# Patient Record
Sex: FEMALE | Race: WHITE | Employment: OTHER | URBAN - METROPOLITAN AREA
[De-identification: names, ages, dates, MRNs, and addresses within clinical notes are randomized per-mention and may not be internally consistent; named-entity substitution may affect disease eponyms.]

---

## 2024-09-30 ENCOUNTER — TELEPHONE (OUTPATIENT)
Dept: SURGERY | Facility: CLINIC | Age: 74
End: 2024-09-30

## 2024-09-30 ENCOUNTER — HOSPITAL ENCOUNTER (OUTPATIENT)
Dept: GENERAL RADIOLOGY | Facility: HOSPITAL | Age: 74
Discharge: HOME | End: 2024-09-30
Attending: NEUROLOGICAL SURGERY
Payer: MEDICARE

## 2024-09-30 ENCOUNTER — OFFICE VISIT (OUTPATIENT)
Dept: SURGERY | Facility: CLINIC | Age: 74
End: 2024-09-30
Payer: MEDICARE

## 2024-09-30 VITALS
HEIGHT: 63 IN | HEART RATE: 83 BPM | BODY MASS INDEX: 31.89 KG/M2 | SYSTOLIC BLOOD PRESSURE: 122 MMHG | DIASTOLIC BLOOD PRESSURE: 64 MMHG | WEIGHT: 180 LBS

## 2024-09-30 DIAGNOSIS — G95.9 CERVICAL MYELOPATHY (HCC): Primary | ICD-10-CM

## 2024-09-30 DIAGNOSIS — G95.9 CERVICAL MYELOPATHY (HCC): ICD-10-CM

## 2024-09-30 PROCEDURE — 99204 OFFICE O/P NEW MOD 45 MIN: CPT | Performed by: NEUROLOGICAL SURGERY

## 2024-09-30 PROCEDURE — 72052 X-RAY EXAM NECK SPINE 6/>VWS: CPT | Performed by: NEUROLOGICAL SURGERY

## 2024-09-30 RX ORDER — AMLODIPINE BESYLATE 5 MG/1
TABLET ORAL
COMMUNITY
Start: 2024-04-30

## 2024-09-30 RX ORDER — BIOTIN 1 MG
1000 TABLET ORAL 3 TIMES DAILY
COMMUNITY

## 2024-09-30 RX ORDER — FOLIC ACID 1 MG/1
TABLET ORAL DAILY
COMMUNITY

## 2024-09-30 RX ORDER — LABETALOL 100 MG/1
100 TABLET, FILM COATED ORAL 2 TIMES DAILY
COMMUNITY

## 2024-09-30 RX ORDER — LEVOTHYROXINE SODIUM 100 UG/1
TABLET ORAL
COMMUNITY
Start: 2024-04-28

## 2024-09-30 RX ORDER — DULOXETIN HYDROCHLORIDE 60 MG/1
60 CAPSULE, DELAYED RELEASE ORAL DAILY
COMMUNITY

## 2024-09-30 RX ORDER — FENOFIBRATE 145 MG/1
TABLET, COATED ORAL
COMMUNITY
Start: 2024-04-15

## 2024-09-30 RX ORDER — CARVEDILOL 25 MG/1
TABLET ORAL
COMMUNITY
Start: 2024-06-11

## 2024-09-30 RX ORDER — HYDROCHLOROTHIAZIDE 25 MG/1
TABLET ORAL
COMMUNITY
Start: 2024-09-11

## 2024-09-30 RX ORDER — PANTOPRAZOLE SODIUM 20 MG/1
20 TABLET, DELAYED RELEASE ORAL
COMMUNITY

## 2024-09-30 RX ORDER — DONEPEZIL HYDROCHLORIDE 10 MG/1
TABLET, FILM COATED ORAL
COMMUNITY
Start: 2024-05-29

## 2024-09-30 RX ORDER — PREDNISONE 5 MG/1
TABLET ORAL
COMMUNITY
Start: 2024-06-10

## 2024-09-30 RX ORDER — DAPAGLIFLOZIN 5 MG/1
5 TABLET, FILM COATED ORAL DAILY
COMMUNITY

## 2024-09-30 RX ORDER — BUPROPION HYDROCHLORIDE 150 MG/1
TABLET ORAL
COMMUNITY
Start: 2024-05-29

## 2024-09-30 RX ORDER — ALENDRONATE SODIUM 70 MG/1
TABLET ORAL
COMMUNITY
Start: 2024-05-28

## 2024-09-30 RX ORDER — POTASSIUM CHLORIDE 750 MG/1
TABLET, EXTENDED RELEASE ORAL
COMMUNITY
Start: 2024-04-29

## 2024-09-30 RX ORDER — ENALAPRIL MALEATE 20 MG/1
TABLET ORAL
COMMUNITY
Start: 2024-06-03

## 2024-09-30 RX ORDER — BUPROPION HYDROCHLORIDE 300 MG/1
TABLET ORAL
COMMUNITY
Start: 2024-08-15

## 2024-09-30 RX ORDER — CLONIDINE HYDROCHLORIDE 0.1 MG/1
TABLET ORAL
COMMUNITY
Start: 2024-05-28

## 2024-09-30 RX ORDER — HYDROXYCHLOROQUINE SULFATE 200 MG/1
TABLET, FILM COATED ORAL
COMMUNITY
Start: 2024-09-20

## 2024-09-30 RX ORDER — FERROUS SULFATE 325(65) MG
325 TABLET, DELAYED RELEASE (ENTERIC COATED) ORAL
COMMUNITY

## 2024-09-30 RX ORDER — MEMANTINE HYDROCHLORIDE 5 MG/1
5 TABLET ORAL 2 TIMES DAILY
COMMUNITY

## 2024-09-30 NOTE — PROGRESS NOTES
[facility]  Neurological Surgery Clinic Note    Dacia Bran  3/20/1950  [id number]  PCP: ASHLEY URIOSTEGUI    REASON FOR VISIT:  RUE radiculopathy    HISTORY OF PRESENT ILLNESS:  Dacia Bran is a(n) 74 year old female who states she has neck pain with RUE radiculopathy which seems to be progressing.  She states she drops things with her R hand and feels her gait is off as well. Family at bedside agree.  She denies any falls. She denies any bowel/bladder habit changes.    MRI shows severe DDD with spondy at C3-4, C4-5, C5-6 and C6-7.  Mild reversal of normal lordotic curve      Location: neck apin  Quality: RUE radiculopathy and weakness  Severity: progressive  Duration: months  Timing: any  Context: severe DDD from C3-7  Modifying Factors: none   Associated signs/symptoms: pain      PAST MEDICAL HISTORY:  Past Medical History:    Chronic kidney disease, stage 3b (HCC)    Diabetes (HCC)    Essential hypertension       PAST SURGICAL HISTORY:  Past Surgical History:   Procedure Laterality Date    Lumbar spine surgery  2022       FAMILY HISTORY:  family history is not on file.    SOCIAL HISTORY:   reports that she has quit smoking. Her smoking use included cigarettes. She has never used smokeless tobacco. She reports that she does not drink alcohol and does not use drugs.    ALLERGIES:  Allergies   Allergen Reactions    Methotrexate OTHER (SEE COMMENTS)     Swelling       MEDICATIONS:  Current Outpatient Medications on File Prior to Visit   Medication Sig Dispense Refill    levothyroxine 100 MCG Oral Tab       amLODIPine 5 MG Oral Tab       buPROPion  MG Oral Tablet 24 Hr       buPROPion  MG Oral Tablet 24 Hr       cloNIDine 0.1 MG Oral Tab       enalapril 20 MG Oral Tab       fenofibrate 145 MG Oral Tab       hydroCHLOROthiazide 25 MG Oral Tab       hydroxychloroquine 200 MG Oral Tab       Potassium Chloride ER 10 MEQ Oral Tab CR       DULoxetine 60 MG Oral Cap DR Particles Take 1 capsule (60  mg total) by mouth daily.      folic acid 1 MG Oral Tab Take by mouth daily.      ferrous sulfate 325 (65 FE) MG Oral Tab EC Take 1 tablet (325 mg total) by mouth daily with breakfast.      memantine 5 MG Oral Tab Take 1 tablet (5 mg total) by mouth 2 (two) times daily.      pantoprazole 20 MG Oral Tab EC Take 1 tablet (20 mg total) by mouth every morning before breakfast.      labetalol 100 MG Oral Tab Take 1 tablet (100 mg total) by mouth 2 (two) times daily.      dapagliflozin (FARXIGA) 5 MG Oral Tab Take 1 tablet (5 mg total) by mouth daily.      Cholecalciferol 125 MCG (5000 UT) Oral Tab Take 1 tablet (5,000 Units total) by mouth daily.      Biotin 1000 MCG Oral Tab Take 1,000 mcg by mouth 3 (three) times daily.      carvedilol 25 MG Oral Tab  (Patient not taking: Reported on 9/30/2024)      donepezil 10 MG Oral Tab  (Patient not taking: Reported on 9/30/2024)      alendronate 70 MG Oral Tab  (Patient not taking: Reported on 9/30/2024)      metFORMIN 500 MG Oral Tab  (Patient not taking: Reported on 9/30/2024)      predniSONE 5 MG Oral Tab  (Patient not taking: Reported on 9/30/2024)       No current facility-administered medications on file prior to visit.       REVIEW OF SYSTEMS:  Review of Systems   All other systems reviewed and are negative.       PHYSICAL EXAMINATION:  Neurologic Exam     Mental Status   Oriented to person, place, and time.     Cranial Nerves   Cranial nerves II through XII intact.     Motor Exam   Muscle bulk: normal  Overall muscle tone: normal  Right arm tone: normal  Left arm tone: normal  Right arm pronator drift: absent  Left arm pronator drift: absent  Right leg tone: normal  Left leg tone: normal    Strength   Strength 5/5 throughout.     Sensory Exam   Light touch normal.   Vibration normal.   Proprioception normal.   Pinprick normal.     Gait, Coordination, and Reflexes     Gait  Gait: normal    Reflexes   Right brachioradialis: 2+  Left brachioradialis: 2+  Right biceps:  2+  Left biceps: 2+  Right triceps: 2+  Left triceps: 2+  Right patellar: 2+  Left patellar: 2+  Right achilles: 2+  Left achilles: 2+  Right : 2+  Left : 2+       IMAGING:  As above    ASSESSMENT:  Cervical spondylotic myelopathy/radiculopathy    Plan:  Obtain XR C spine  Recommend C3-7 ACDF      The plan of care was discussed with the patient at length. All questions and concerns were addressed at this time. The patient verbalized agreement with the plan and was appreciative.     Total visit time: 45 minutes; More than 50% spent coordinating care, counseling, reviewing imaging and discussing medication therapy.     Wei Cheek, DO  Neurological Surgery  [facility]

## 2024-09-30 NOTE — PATIENT INSTRUCTIONS
You are scheduled for C3-C7 ACDF on 11.14.24 with  at [facility].    You will need to contact the Pre-admission department at [phone] to schedule your pre-op testing.  They will get you scheduled for all the blood work, MRSA/MSSA, chest xray and EKG if needed. If they do not answer when you call, please leave a message and they will call you back, they return calls in surgical order, it may be a few days before they return your call.    PCP clearance is needed.  We have faxed a clearance request to Dr. Cheek 's office.  Please contact their office for appointment.     CARDIAC clearance is needed.  We have faxed a request for pre-op clearance to your CARDIOLOGIST Dr Acosta. Please contact their office for appointment.    NEPHROLOGY clearance is needed.  We have faxed a request for pre-op clearance to your NEPHROLOGY Dr Huang. Please contact their office for appointment.    Do not take any blood thinning medications, over the counter non-steroidal anti inflammatories (ibuprofen, advil, aleve etc.), herbal supplements (garlic,tumeric etc.), vitamins, fish oil or krill oil for at least 7-14 days prior to surgery.     If you were on blood thinners (such as Coumadin, Plavix, Pradaxa, Xarelto, etc) prior to surgery that we had you stop for surgery, you will need to be cleared by your cardiologist to hold the medication prior to surgery.  Please make sure you get instructions about when to resume the medication before you are discharged from the hospital.    You may only take Tylenol, Extra Strength Tylenol, Arthritis Tylenol, or prescription Norco or Tramadol for pain if something is needed.    You should have nothing to eat or drink after 11:00pm the night prior to surgery except the following:    Do drink 12 ounces of regular Gatorade (NOT RED) 12 hours and 4 hours prior to your scheduled surgery time, Do not drink any other liquids (including water) before your surgery. Do not chew gum or eat candies  before surgery.  Take 1000 mg of Tylenol (Acetaminophen) 4 hours before your scheduled surgery time, take this with your scheduled Gatorade.    Surgery is usually scheduled as a 1-2 day admission.  This is an estimate and varies from person to person.  Ultimately, the surgeon will determine when you are ready to be discharged.    Our office will get authorization for surgery. We will attempt to contact you 3 days before surgery if we run into any complications or have not received your surgery authorization. We will continue to attempt to get authorization until 2pm the day before surgery. If authorization is not received we will give you a call to discuss next steps. Our goal is to make sure you do not proceed with an un-authorized surgery so that you do not end up having to pay for this surgery out of pocket.     The hospital will contact you 1-2 days before surgery with your expected arrival time.     You may need an Aspen cervical collar (A Rep will assists in setting these up if ordered and authorized). Your Rep will provide you with their contact information in case you have any questions about the device or it's use.     You may need an external bone growth stimulator. If ordered by your surgery a rep will contact you either before or after your surgery. Your Rep will provide you with their contact information in case you have any questions about the device or it's use.     To prevent infection post-operatively, you will need to shower with Hibiclens soap for 5 days prior to surgery. The last shower should be the night before surgery.  Hibiclens soap can be found at any pharmacy in the first-aid section.  See detailed instructions below.    Per Dr. Cheek's surgery protocol your appointments are as follows:     2 week pre-op surgical telehealth appointment is on 10.30.24 at 9:00 am with Katharine Crook RN     1 week pre-op surgical review scheduled on 11.6.24 at 11:20 am with Dr. Cheek.    1 week post-op  appointment scheduled on 24 at 11:00 am with REAL Weaver     4 week post-op appointment scheduled on 24 at 11:20 am with Dr. Cheek     3 month post surgery appointment scheduled on 25 at 11:30 am with REAL Weaver     Please make sure to arrive at least 15 minutes prior to your scheduled appointment in order to get checked in and roomed in a timely manner.  Depending on provider availability, late patients may be required to reschedule.  REFILLS:  After surgery, please remember that we do have a 48 hour refill policy that does not include weekends, please make sure to request your medications in a timely manner so that you do not go days without medication.  *Refills should be requested through your pharmacy or through the refill request in [employer] (log in, go to medications, then select refill request).  [email or web address] MESSAGING:  Please remember that our office is closed during the weekend and no one is available for [employer] messages. If you have an urgent or emergent matter please go to a walk-in center or the emergency room. Also please remember your [employer] messages are part of your legal medical record and should not be utilized as a personal email with our providers as it is visible to all [facility] employees. Also, Since [employer] messages are not for emergent matters it may be several days before there is a response to your message.  FMLA/PAPERWORK COMPLETED BY OUR MEDICAL FORMS DEPARTMENT:    If you require FMLA paperwork for your surgery, please make sure to either Drop it off or have it faxed to our office at [phone]. Make sure it has your NAME, , and has your signature. You will need to have a Release of Information on file. To facilitate this process we ask that you requested it at the  on your way out and sign it. Without a signed MIKAELA or signature on the form we will not be able to fax it and this will cause a delay with your forms. Fees charged for forms  are $25 for initial submission and $15 for recertifications. If you have questions on the status of your forms please call the forms department at [phone].  **We do have a 3 week policy for all forms and paperwork, please make sure to allow plenty of time for completion. Same day paperwork will not be completed. **    Spine Navigator  You will have a 30 minute telehealth visit with our spine navigator approximately 2 weeks before your surgery. This visit is NOT optional. This appointment will get booked when you schedule your spinal surgery.  When speaking with Pre-admissions you will be told about a spine class as it relates to your surgery, this is an OPTIONAL class. The same or similar information will be discussed with you during your telehealth appointment with the spine navigator (Spine Navigator appointment is not optional). However, if you would like to have this information repeated to you or if you would feel more comfortable with additional information, you can elect to schedule this class during your pre-admissions phone call.  The Pre-op Spine Surgery Class is held at [facility] most Wednesdays from 3:30-4:30 pm. Call Pre-admission Testing (PAT) to register at:  [phone]. Please park in the [facility] Parking garage, check in at registration and meet in the [facility] lobby for your escort to class on the Ortho Spine unit. If unable to attend, but would like additional information, the class is available online at [email or web address].     Due to COVID, visitor guidelines are constantly changing.  Please visit the following website for the detailed and up-to-date visitor screening and restriction policy at [facility] [email or web address].    Hibiclens Bathing  Hibiclens is a body soap that is used before surgery protect you from getting an infection after surgery   Hibiclens comes in a large blue bottle and can be found in most pharmacies in the First Aid  supplies  Shower with this daily for FIVE consecutive days before surgery, using the entire bottle over the five days.  The last shower should be the night before surgery.   Steps to bathing with Hibiclens  Do not use Hibiclens on your hair, face or private areas  Wash your hair and face as normal with your usual cleansers  Rinse well  Using a clean wet washcloth apply enough Hibiclens to cover your body. Wash from the neck down avoiding the genital areas and concentrating on the surgical area  Rinse well  Dry yourself with a clean, dry towel  Do not use any powders, creams, lotions or sprays on your body as these attract bacteria  Deodorant and facial creams are acceptable.   (Laundering/cleaning: Chlorhexidine gluconate skin cleansers will cause stains if used with chlorine releasing products. Rinse completely and use only non-chlorine detergents.)      For Office Use Only:  Medical Clearance Requested: PCP, CARDS, NEPHROLOGY  PA: medicare  CPT Codes:

## 2024-09-30 NOTE — TELEPHONE ENCOUNTER
You are scheduled for C3-C7 ACDF on 11.14.24 with  at [facility].    You will need to contact the Pre-admission department at [phone] to schedule your pre-op testing.  They will get you scheduled for all the blood work, MRSA/MSSA, chest xray and EKG if needed. If they do not answer when you call, please leave a message and they will call you back, they return calls in surgical order, it may be a few days before they return your call.    PCP clearance is needed.  We have faxed a clearance request to Dr. Cheek 's office.  Please contact their office for appointment.     CARDIAC clearance is needed.  We have faxed a request for pre-op clearance to your CARDIOLOGIST Dr Acosta. Please contact their office for appointment.    NEPHROLOGY clearance is needed.  We have faxed a request for pre-op clearance to your NEPHROLOGY Dr Huang. Please contact their office for appointment.    Do not take any blood thinning medications, over the counter non-steroidal anti inflammatories (ibuprofen, advil, aleve etc.), herbal supplements (garlic,tumeric etc.), vitamins, fish oil or krill oil for at least 7-14 days prior to surgery.     If you were on blood thinners (such as Coumadin, Plavix, Pradaxa, Xarelto, etc) prior to surgery that we had you stop for surgery, you will need to be cleared by your cardiologist to hold the medication prior to surgery.  Please make sure you get instructions about when to resume the medication before you are discharged from the hospital.    You may only take Tylenol, Extra Strength Tylenol, Arthritis Tylenol, or prescription Norco or Tramadol for pain if something is needed.    You should have nothing to eat or drink after 11:00pm the night prior to surgery except the following:    Do drink 12 ounces of regular Gatorade (NOT RED) 12 hours and 4 hours prior to your scheduled surgery time, Do not drink any other liquids (including water) before your surgery. Do not chew gum or eat candies  before surgery.  Take 1000 mg of Tylenol (Acetaminophen) 4 hours before your scheduled surgery time, take this with your scheduled Gatorade.    Surgery is usually scheduled as a 1-2 day admission.  This is an estimate and varies from person to person.  Ultimately, the surgeon will determine when you are ready to be discharged.    Our office will get authorization for surgery. We will attempt to contact you 3 days before surgery if we run into any complications or have not received your surgery authorization. We will continue to attempt to get authorization until 2pm the day before surgery. If authorization is not received we will give you a call to discuss next steps. Our goal is to make sure you do not proceed with an un-authorized surgery so that you do not end up having to pay for this surgery out of pocket.     The hospital will contact you 1-2 days before surgery with your expected arrival time.     You may need an Aspen cervical collar (A Rep will assists in setting these up if ordered and authorized). Your Rep will provide you with their contact information in case you have any questions about the device or it's use.     You may need an external bone growth stimulator. If ordered by your surgery a rep will contact you either before or after your surgery. Your Rep will provide you with their contact information in case you have any questions about the device or it's use.     To prevent infection post-operatively, you will need to shower with Hibiclens soap for 5 days prior to surgery. The last shower should be the night before surgery.  Hibiclens soap can be found at any pharmacy in the first-aid section.  See detailed instructions below.    Per Dr. Cheek's surgery protocol your appointments are as follows:     2 week pre-op surgical telehealth appointment is on 10.30.24 at 9:00 am with Katharine Crook RN     1 week pre-op surgical review scheduled on 11.6.24 at 11:20 am with Dr. Cheek.    1 week post-op  appointment scheduled on 24 at 11:00 am with REAL Weaver     4 week post-op appointment scheduled on 24 at 11:20 am with Dr. Cheek     3 month post surgery appointment scheduled on 25 at 11:30 am with REAL Weaver     Please make sure to arrive at least 15 minutes prior to your scheduled appointment in order to get checked in and roomed in a timely manner.  Depending on provider availability, late patients may be required to reschedule.  REFILLS:  After surgery, please remember that we do have a 48 hour refill policy that does not include weekends, please make sure to request your medications in a timely manner so that you do not go days without medication.  *Refills should be requested through your pharmacy or through the refill request in [employer] (log in, go to medications, then select refill request).  [employer] MESSAGING:  Please remember that our office is closed during the weekend and no one is available for [employer] messages. If you have an urgent or emergent matter please go to a walk-in center or the emergency room. Also please remember your [employer] messages are part of your legal medical record and should not be utilized as a personal email with our providers as it is visible to all [facility] employees. Also, Since [employer] messages are not for emergent matters it may be several days before there is a response to your message.  FMLA/PAPERWORK COMPLETED BY OUR MEDICAL FORMS DEPARTMENT:    If you require FMLA paperwork for your surgery, please make sure to either Drop it off or have it faxed to our office at [phone]. Make sure it has your NAME, , and has your signature. You will need to have a Release of Information on file. To facilitate this process we ask that you requested it at the  on your way out and sign it. Without a signed MIKAELA or signature on the form we will not be able to fax it and this will cause a delay with your forms. Fees charged for forms  are $25 for initial submission and $15 for recertifications. If you have questions on the status of your forms please call the forms department at [phone].  **We do have a 3 week policy for all forms and paperwork, please make sure to allow plenty of time for completion. Same day paperwork will not be completed. **    Spine Navigator  You will have a 30 minute telehealth visit with our spine navigator approximately 2 weeks before your surgery. This visit is NOT optional. This appointment will get booked when you schedule your spinal surgery.  When speaking with Pre-admissions you will be told about a spine class as it relates to your surgery, this is an OPTIONAL class. The same or similar information will be discussed with you during your telehealth appointment with the spine navigator (Spine Navigator appointment is not optional). However, if you would like to have this information repeated to you or if you would feel more comfortable with additional information, you can elect to schedule this class during your pre-admissions phone call.  The Pre-op Spine Surgery Class is held at [facility] most Wednesdays from 3:30-4:30 pm. Call Pre-admission Testing (PAT) to register at:  [phone]. Please park in the [facility] Parking garage, check in at registration and meet in the [facility] lobby for your escort to class on the Ortho Spine unit. If unable to attend, but would like additional information, the class is available online at [email or web address].     Due to COVID, visitor guidelines are constantly changing.  Please visit the following website for the detailed and up-to-date visitor screening and restriction policy at [facility] [email or web address].    Hibiclens Bathing  Hibiclens is a body soap that is used before surgery protect you from getting an infection after surgery   Hibiclens comes in a large blue bottle and can be found in most pharmacies in the First Aid  supplies  Shower with this daily for FIVE consecutive days before surgery, using the entire bottle over the five days.  The last shower should be the night before surgery.   Steps to bathing with Hibiclens  Do not use Hibiclens on your hair, face or private areas  Wash your hair and face as normal with your usual cleansers  Rinse well  Using a clean wet washcloth apply enough Hibiclens to cover your body. Wash from the neck down avoiding the genital areas and concentrating on the surgical area  Rinse well  Dry yourself with a clean, dry towel  Do not use any powders, creams, lotions or sprays on your body as these attract bacteria  Deodorant and facial creams are acceptable.   (Laundering/cleaning: Chlorhexidine gluconate skin cleansers will cause stains if used with chlorine releasing products. Rinse completely and use only non-chlorine detergents.)      For Office Use Only:  Medical Clearance Requested: PCP, CARDS, NEPHROLOGY  PA: medicare  CPT Codes:

## 2024-09-30 NOTE — PROGRESS NOTES
New patient:  Reason for visit:   Neck discomfort, R shoulder to R arm pain with numbness and tingling on R hand   Pt also reports balance issues   Symptoms initially started in March    Numeric Rating Scale:         Pain at Present:  2/10                                                                                                              Prior diagnostic testing related to this condition:    MRI cervical spine DOS 09/09/24 uploaded to pacs

## 2024-10-08 PROBLEM — N18.32 STAGE 3B CHRONIC KIDNEY DISEASE (HCC): Status: ACTIVE | Noted: 2024-10-08

## 2024-10-08 NOTE — TELEPHONE ENCOUNTER
Patient is scheduled for C3-C7 ACDF on 11.14.24 with  at [facility].     Y pre-op apt scheduled (if sx is more than 30 days from last apt)  Y pre-op apt scheduled with RN spine navigator  Y Surgical instructions reviewed by nursing staff with patient  Y  form completed  Y Surgery order signed   Y Placed sx on surgery sheet  Y Placed on outlook calendar  NA [employer] message sent to patient with sx instructions  Y Faxed pre-op clearance request to PCP GILA CABA Faxed letter to prescribing provider requesting anticoagulants be held for surgery  Y E-mail sent to [employer]  Y Post-op appointments made  NA PA NOT NEEDED. Routed to PA team to initiate.  Y Post-Op outreach pt reminder placed.   Y Entire Neurosurgery Checklist Completed    Clearances: PCP, CARDS, NEPHROLOGY   PA:MEDICARE A&B  CPT Codes: 41203, 22552 x3, 33326, 14235, 76949, 85597, 89648, 29329

## 2024-10-09 NOTE — TELEPHONE ENCOUNTER
Referred to Cristal Valdovinos LSO brace    Face sheet, order and OV note faxed to:  [phone]

## 2024-10-09 NOTE — TELEPHONE ENCOUNTER
Referred to [facility]  Dispense external bone growth stimulator    Face sheet, order and OV note faxed to:  [phone]

## 2024-10-10 ENCOUNTER — TELEPHONE (OUTPATIENT)
Dept: SURGERY | Facility: CLINIC | Age: 74
End: 2024-10-10

## 2024-10-10 NOTE — TELEPHONE ENCOUNTER
Patient is schedule for surgery and needs to plan her trip to [place] would like  to know  if the recovery will be 6  or 12 weeks ? She remembers Dr. Cheek saying if the surgery is through the front is 6 weeks if its through the back is 12 weeks recovery. She needs to know as soon as possible so she can plan accordingly.

## 2024-10-10 NOTE — TELEPHONE ENCOUNTER
Message below noted.    Surgery scheduled for 11.14.24 C3-7 ACDF.     LOV 9.30.24  \"ASSESSMENT:  Cervical spondylotic myelopathy/radiculopathy     Plan:  Obtain XR C spine  Recommend C3-7 ACDF\"    Routed to Provider.

## 2024-10-11 ENCOUNTER — TELEPHONE (OUTPATIENT)
Dept: SURGERY | Facility: CLINIC | Age: 74
End: 2024-10-11

## 2024-10-11 ENCOUNTER — MED REC SCAN ONLY (OUTPATIENT)
Dept: SURGERY | Facility: CLINIC | Age: 74
End: 2024-10-11

## 2024-10-11 RX ORDER — MONTELUKAST SODIUM 10 MG/1
10 TABLET ORAL NIGHTLY
COMMUNITY

## 2024-10-11 RX ORDER — DONEPEZIL HYDROCHLORIDE 10 MG/1
10 TABLET, FILM COATED ORAL NIGHTLY
COMMUNITY

## 2024-10-11 NOTE — TELEPHONE ENCOUNTER
Message below noted.    Called and advised pt of message.    Advised pt if she has any other questions or concerns to reach back out at anytime.    Pt acknowledged and appreciative of message.    Nothing needed further with this encounter.

## 2024-10-11 NOTE — TELEPHONE ENCOUNTER
Received DWO & [facility] of medical [facility] , for review and signature from provider  Endorsed in neuro surgery bin for clinical staff.

## 2024-10-15 NOTE — TELEPHONE ENCOUNTER
Received from [facility]  DOS 10/11/24 cardiac clearance letter.    Endorsed to neurosurgery [facility] for clinical staff.

## 2024-10-16 NOTE — TELEPHONE ENCOUNTER
CARDS presurgical clearance received per letter dated 10.15.24, \"This letter is to certify that Anjelica Bran,  1950, is cleared for the planned surgery with no indication for increased risk of cardiac complications\"    Faxed to Sam DOMINGUEZ; sent to scan; copy placed in ppwrk sent to scan folder

## 2024-10-25 NOTE — TELEPHONE ENCOUNTER
Received DWO & letter of medical necessity for cervical collar from [facility]    paperwork reviewed and signed by provider, paperwork was faxed to [phone] and sent to scan

## 2024-10-30 ENCOUNTER — NURSE ONLY (OUTPATIENT)
Age: 74
End: 2024-10-30
Payer: MEDICARE

## 2024-10-30 DIAGNOSIS — Z71.9 ENCOUNTER FOR EDUCATION: Primary | ICD-10-CM

## 2024-10-30 NOTE — PROGRESS NOTES
RN Spine Navigator Education for [place]     If you have received instructions from your surgeon that are different from those listed below, please follow your physician's instructions.    You are scheduled for a Cervical fusion with Dr. Cheek on 11/14.      Patient Surgical Goals: Decreased weakness and pain.     Before Your Surgery    Choose a Care Partner  Patient attended spine navigator visit independently.  Care partner is sons. Your care partner(s) should be able to provide transportation to and from the hospital. They should be able to help at home for the first week after discharge, including helping you with meals, medication, and dressing changes.    Clearance Before Surgery  You will need to see your primary care provider within 30 days before surgery. Please make sure this appointment is at least a week before surgery as more testing or doctor visits may be ordered. Presurgical testing may include labs, nasal swab, imaging, EKG.   Make sure that you complete all preadmission testing so that surgery does not get delayed.    Home Environment  Assessed home status: home has stairs and bedroom and bathroom are on first floor. Patient has a ramp into the house. Suggested arrangements for help at home.  It is recommended that you prepare your home by putting clean sheets on your bed, freezing meals, and putting frequently used items at waist level.   Prevent falls by removing items that could cause you to trip, adding nightlights and adding a nonskid mat in shower.   Assistive devices can be purchased at a medical supply store or online including canes, walkers, toileting devices (commodes, raised toilet seats, toilet paper wiping aid), long handled sponge, shower chair or tub transfer bench, grabber/reacher tool, sock aid, long handled shoehorn, if needed.      Tobacco, Nicotine and Marijuana Use   Not applicable    Medications to Stop   For 7-10 days before surgery do not take any blood thinning medications.  This includes non-steroidal anti-inflammatories or NSAIDs (Advil, Aleve, Motrin, ibuprofen, naproxen, meloxicam, diclofenac, celecoxib, etc.), aspirin (unless told otherwise by your cardiologist or surgeon), herbal supplements and vitamins (garlic, turmeric, vitamin E, fish oil or krill oil, etc.). You may only take Tylenol or prescribed narcotic medication if needed for pain.   Other medications to stop include: hold ACE/ARBS day of surgery - enalapril     Leading Up to Day of Surgery  Five days before surgery wash with Hibiclens soap after your regular body soap every day. Do not put use Hibiclens on your face, hair or privates. Your last shower should be the night before surgery. and use mupirocin (Bactroban) if prescribed.   One-two business days before surgery, the preadmission testing staff will call you and let you know what time to arrive, where to park, when to take your Tylenol and Gatorade, and will provide any additional instructions.   After 11pm the day before surgery, do not eat or drink anything (including water, gum, or candies) except for Tylenol and Gatorade.   Drink 12 ounces of regular Gatorade (NOT RED) 12 hours prior to your scheduled surgery time. Drink your second 12 ounces of regular Gatorade and take 1000 mg of Tylenol (acetaminophen) 4 hours before your scheduled surgery time.     Items to Bring to the Hospital   Bring insurance card, ID, advanced directive, or medical power of  paperwork, loose fitting clothing, shoes with a back that can accommodate swollen feet, long phone charging cord, hearing aids and brace.  Do not bring jewelry, valuables, or medications.   If you take an uncommon medication that the hospital may not have, it must be brought to the hospital in the original container, and you must notify the nurse of this medication.     In the Hospital     Operative Day and Hospital Stay  In the preoperative area, you will change into a gown, have an IV placed in your hand  or arm by the nurse, and sign any consent paperwork that is needed for your procedure. You will speak to the surgeon and anesthesiologist. It is important to tell the doctors and nurses if you have had any significant side effects from pain medications or anesthesia such as a rash or severe nausea.    In the operating room, the anesthesiologist will attach monitors, give you oxygen through a mask, and give you medicine through the IV to fall asleep. After you are sleeping, the breathing tube will be placed. The surgeon may use additional nerve monitoring during your surgery. This is to make sure that the muscles and nerves in your arms and legs are working normally as he operates. The equipment will be hooked up and removed while you are asleep. You will wake up on the hospital bed.     During the surgery, your care partner can sit in the surgical waiting area. There are TV screens in that area that keep them informed of your progress. If the procedure is at [facility], there is a person who can speak to them about your surgical progress.     In the recovery room, monitors will be attached that check your heart rate, blood pressure and oxygen levels. While you may not remember this part, a nurse is with you and constantly checking on your condition. Medications for pain and nausea will be given if you need them. You may have a banks catheter to empty your bladder or a drain at your surgical site. Your family is not allowed in the recovery room. When you are ready to leave the recovery room, you will be transported on your bed to the inpatient unit accompanied by your family once a room is available.  On the inpatient unit, a team of doctors and advanced practice providers will manage your care. The spine care nurses will check your blood pressure, temperature, heartbeat, breathing, stomach sounds and your incision. They may assess the strength and sensation in your arms and legs. Medications that are given in the  hospital include antibiotics, IV fluids, pain medications, muscle relaxers, stool softeners, and your home medications. You may get blood drawn and another x-ray. Physical and occupational therapists may come to your room to teach you how to move around safely after surgery.     Post Op Plan   The average length of hospital stay is one to three days. A  may visit you to help arrange extra care for you once you leave the hospital. Occasionally, it is recommended that a home health nurse or therapist visit you in your home for a short time. The best place to recover is your own home, but if you need more assistance than home health and your care partner can provide, the  will help you and your family choose other facilities to help you recover your strength.    Preventing surgical complications  It is important to follow all instructions before and after surgery to decrease the risks of surgery and prevent complications.     Blood clots: walk, wear leg compression devices in the hospital, and do ankle pumps at home  Infection: wash with Hibiclens before surgery, wash your hands, don't touch your incision  Pneumonia: take deep breaths and cough and use the breathing exercise (incentive spirometer)   Nausea/vomiting: start with liquids and small meals and do not take pills on an empty stomach  Constipation: drink water and walk frequently    Tell your nurse if you are experiencing nausea, vomiting or constipation as they have medications to help treat these.      At home     Understanding Pain After Surgery  We will do our best to manage your pain after surgery, but it is not possible to be completely pain-free. There will be operative pain in your back or neck. Pain in the arms or legs may be one of the first things to improve. Numbness, weakness, and tingling should improve over time. However, there can be a temporary increase in symptoms in the first few days due to inflammation from surgery.    Pain medications will be prescribed to take home at discharge. The goal of pain medicine after surgery is to make your pain tolerable, not to make you pain free. We encourage you to use the medication prescribed to you after your surgery, but please take the lowest possible dose to manage your pain. Taking high doses of narcotics can cause side effects. Transition to plain Tylenol when your pain improves. At [facility], your prescriptions can be filled by our pharmacy and brought to you bedside. You may get more continuous pain relief by alternating between medications if you have multiple instead of taking them at the same time. Write down when you have taken a medication as it may be difficult to remember after a few doses.    Post operative medication   Tylenol (acetaminophen): take for pain. Do not take more than 3000 mg - 4000 mg in 24 hours because it can damage your liver.   Narcotics: take for moderate to severe pain. Do not drink alcohol or drive while taking narcotics. Some narcotic medications (Norco, Tylenol #3, Percocet, Vicodin) contain Tylenol (acetaminophen). Make sure to not exceed the maximum dose if you are taking additional Tylenol with these medications.  Muscle relaxers: take for muscle cramping. These can cause drowsiness.    NSAIDS (Advil, Aleve, Motrin, ibuprofen, naproxen, meloxicam, diclofenac, celecoxib, etc.) or aspirin: Do not take these unless your physician says it is ok. For a fusion, it may be several months before you can take NSAIDS.  Stool softeners: take to prevent constipation while you are taking narcotic medications. You can get these over the counter at the pharmacy. You may use laxatives, which are stronger, if needed.    If you believe you will need more of medication your surgeon has prescribed to you, request a refill through your pharmacy or through the refill request in [employer] (log in, go to medications, then select refill request) at least two business days  before you run out of medication.     Nonpharmacologic pain management   You may use ice on your incision for 20 minutes every hour to help with pain and swelling. Do not place ice directly on your skin. You can use heat to sooth your muscles but avoid placing heat directly on your incision. Make frequent position changes. You can do gentle stretching while avoiding significant bending or twisting. Use deep breathing techniques and distractions such as TV, music, reading, or games.   Additional Recommendations for Anterior Cervical Surgery  Smoothies or protein shakes may be more comfortable to consume then solid food for the first week after surgery. To help decrease throat swelling, suck on ice chips and drink cold liquids. Cough drops can also be help decrease throat irritation.    Movement restrictions  After surgery, no bending or twisting your neck or back. Do not lift anything over 10lbs (about the weight of a gallon of milk) or lift anything over your shoulders. Avoid pulling or pushing. You may use stairs while holding the handrail.  It is ok to ride in the car but refrain from driving or traveling long distances until cleared to do so by your surgeon. You may not drive while taking narcotics or muscle relaxants. If you have cervical surgery, it may be several weeks before you can drive. , If you had a fracture or fusion surgery, your doctor may give you a brace. Braces are worn for comfort, when up and moving around, or constantly depending on your doctor's order. Wear your brace as instructed.     Post Op Exercise   Walk frequently. Start with walking short distances and increase as you start to feel better. Do ankle pumps (bending at your ankles, bring your feet towards your head then point them towards the ground) 15-20 times every hour when awake to help prevent blood clots.     Your surgeon will let you know at your post operative appointment if you are ready to decrease your movement restrictions and  increase your exercise. If you have questions in between appointments about lessening your restrictions, please contact the office.     You and your doctor will discuss how you are feeling as you heal and decide if outpatient physical therapy or a medical fitness referral is needed.    Caring for your incision  Always wash your hands before touching your incision. Your incision will be closed with sutures under the skin and skin glue or Steri-Strips (thin white bandages) on top of the skin. Do not attempt to peal off Skin glue/Steri-Strips as they will come off on their own. If the incision is closed with sutures or staples on top of the skin, they will be removed at a post op appointment. The incision may be covered with a gauze dressing that can come off after three days. Once the original gauze dressing is removed, we prefer that you leave your incision open to air (without a gauze dressing). If the incision has drainage or is rubbing against your clothes or brace, you may place gauze and medical tape over it. Change the gauze and medical tape daily. Look at your incision daily to check for signs of infection.   You can shower three days after your surgery or sooner if your surgeon allows. We recommend the care partner be present during the first shower for safety. Let soapy water run over the incision, but do not scrub it or spray it directly. Gently pat it dry after with a clean towel. Do not apply any creams or lotions to the incision. Do not soak in a tub, pool, or any body of water until your incision is fully healed.    Signs of Infection   Check your incision daily for swelling, redness, drainage, pus, bad smell, or opening of the incision.     When to Call for Assistance  Call the Neurosurgery Office ([phone] Option #2) if you experience signs of infection, opening of the incision, continuous nausea or vomiting, poor pain control despite using the pain medication as directed, a sudden increase in  pain, temperature over 101F, difficulty swallowing, leg swelling, or with any concerns, unanswered questions, or new problems, such as new numbness/weakness/tingling.  Call 911 or go to the nearest emergency room if you experience chest pain, difficulty breathing, loss of bowel or bladder control, extreme drowsiness, or any other life-threatening situation.     Follow-up Plan   Appointments with Dr. Cheek or [facility] at 1, 4 and 12 weeks    Answered questions regarding: None     You can contact the RN Spine Navigator at [phone] or [email or web address] with additional questions or feedback on your care. It may take several business days to receive a reply so please do not call the RN spine navigator for refills or for emergencies.    Spine navigator spent 47 minutes conducting a virtual visit to provide education. Thank you for letting the RN Spine Navigator participate in your care.

## 2024-10-30 NOTE — PROGRESS NOTES
RN Spine Navigator Education for Sarahi Bran     If you have received instructions from your surgeon that are different from those listed below, please follow your physician's instructions.    You are scheduled for a Cervical fusion with Dr. Cheek on 24.      Patient Surgical Goals: ***    Before Your Surgery    Choose a Care Partner  Patient attended spine navigator visit {Ind/CarePart:74883}.  Care partner is ***. Your care partner(s) should be able to provide transportation to and from the hospital. They should be able to help at home for the first week after discharge, including helping you with meals, medication, and dressing changes.    Clearance Before Surgery-***  You will need to see your primary care provider within 30 days before surgery. Please make sure this appointment is at least a week before surgery as more testing or doctor visits may be ordered. Presurgical testing may include labs, nasal swab, imaging, EKG.   Make sure that you complete all preadmission testing so that surgery does not get delayed.    Home Environment  Assessed home status: {HomeStatus:90530}. Suggested arrangements for {ArrHelp:94566}.  It is recommended that you prepare your home by putting clean sheets on your bed, freezing meals, and putting frequently used items at waist level.   Prevent falls by removing items that could cause you to trip, adding nightlights and adding a nonskid mat in shower.   Assistive devices can be purchased at a medical supply store or online including canes, walkers, toileting devices (commodes, raised toilet seats, toilet paper wiping aid), long handled sponge, shower chair or tub transfer bench, grabber/reacher tool, sock aid, long handled shoehorn, if needed.      Tobacco, Nicotine and Marijuana Use   {Smokin::\"Not applicable\"}    Medications to Stop   For 7-10 days before surgery do not take any blood thinning medications. This includes non-steroidal anti-inflammatories or NSAIDs  (Advil, Aleve, Motrin, ibuprofen, naproxen, meloxicam, diclofenac, celecoxib, etc.), aspirin (unless told otherwise by your cardiologist or surgeon), herbal supplements and vitamins (garlic, turmeric, vitamin E, fish oil or krill oil, etc.). You may only take Tylenol or prescribed narcotic medication if needed for pain.   Other medications to stop include: hold ACE/ARBS day of surgery  Enalapril-      Leading Up to Day of Surgery  Five days before surgery wash with Hibiclens soap after your regular body soap every day. Do not put use Hibiclens on your face, hair or privates. Your last shower should be the night before surgery.  One-two business days before surgery, the preadmission testing staff will call you and let you know what time to arrive, where to park, when to take your Tylenol and Gatorade, and will provide any additional instructions.   After 11pm the day before surgery, do not eat or drink anything (including water, gum, or candies) except for Tylenol and Gatorade.   Drink 12 ounces of regular Gatorade (NOT RED) 12 hours prior to your scheduled surgery time. Drink your second 12 ounces of regular Gatorade and take 1000 mg of Tylenol (acetaminophen) 4 hours before your scheduled surgery time.     Items to Bring to the Hospital   Bring insurance card, ID, advanced directive, or medical power of  paperwork, loose fitting clothing, shoes with a back that can accommodate swollen feet, long phone charging cord, {Bringwith:55976}.  Do not bring jewelry, valuables, or medications.   If you take an uncommon medication that the hospital may not have, it must be brought to the hospital in the original container, and you must notify the nurse of this medication.     In the Hospital     Operative Day and Hospital Stay  In the preoperative area, you will change into a gown, have an IV placed in your hand or arm by the nurse, and sign any consent paperwork that is needed for your procedure. You will speak to  the surgeon and anesthesiologist. It is important to tell the doctors and nurses if you have had any significant side effects from pain medications or anesthesia such as a rash or severe nausea.    In the operating room, the anesthesiologist will attach monitors, give you oxygen through a mask, and give you medicine through the IV to fall asleep. After you are sleeping, the breathing tube will be placed. The surgeon may use additional nerve monitoring during your surgery. This is to make sure that the muscles and nerves in your arms and legs are working normally as he operates. The equipment will be hooked up and removed while you are asleep. You will wake up on the hospital bed.     During the surgery, your care partner can sit in the surgical waiting area. There are TV screens in that area that keep them informed of your progress. If the procedure is at [facility], there is a person who can speak to them about your surgical progress.     In the recovery room, monitors will be attached that check your heart rate, blood pressure and oxygen levels. While you may not remember this part, a nurse is with you and constantly checking on your condition. Medications for pain and nausea will be given if you need them. You may have a banks catheter to empty your bladder or a drain at your surgical site. Your family is not allowed in the recovery room. When you are ready to leave the recovery room, you will be transported on your bed to the inpatient unit accompanied by your family once a room is available.  On the inpatient unit, a team of doctors and advanced practice providers will manage your care. The spine care nurses will check your blood pressure, temperature, heartbeat, breathing, stomach sounds and your incision. They may assess the strength and sensation in your arms and legs. Medications that are given in the hospital include antibiotics, IV fluids, pain medications, muscle relaxers, stool softeners, and your home  medications. You may get blood drawn and another x-ray. Physical and occupational therapists may come to your room to teach you how to move around safely after surgery.     Post Op Plan   The average length of hospital stay is one to three days. A  may visit you to help arrange extra care for you once you leave the hospital. Occasionally, it is recommended that a home health nurse or therapist visit you in your home for a short time. The best place to recover is your own home, but if you need more assistance than home health and your care partner can provide, the  will help you and your family choose other facilities to help you recover your strength.    Preventing surgical complications  It is important to follow all instructions before and after surgery to decrease the risks of surgery and prevent complications.     Blood clots: walk, wear leg compression devices in the hospital, and do ankle pumps at home  Infection: wash with Hibiclens before surgery, wash your hands, don't touch your incision  Pneumonia: take deep breaths and cough and use the breathing exercise (incentive spirometer)   Nausea/vomiting: start with liquids and small meals and do not take pills on an empty stomach  Constipation: drink water and walk frequently    Tell your nurse if you are experiencing nausea, vomiting or constipation as they have medications to help treat these.      At home     Understanding Pain After Surgery  We will do our best to manage your pain after surgery, but it is not possible to be completely pain-free. There will be operative pain in your back or neck. Pain in the arms or legs may be one of the first things to improve. Numbness, weakness, and tingling should improve over time. However, there can be a temporary increase in symptoms in the first few days due to inflammation from surgery.   Pain medications will be prescribed to take home at discharge. The goal of pain medicine after surgery is  to make your pain tolerable, not to make you pain free. We encourage you to use the medication prescribed to you after your surgery, but please take the lowest possible dose to manage your pain. Taking high doses of narcotics can cause side effects. Transition to plain Tylenol when your pain improves. At [facility], your prescriptions can be filled by our pharmacy and brought to you bedside. You may get more continuous pain relief by alternating between medications if you have multiple instead of taking them at the same time. Write down when you have taken a medication as it may be difficult to remember after a few doses.    Post operative medication   Tylenol (acetaminophen): take for pain. Do not take more than 3000 mg - 4000 mg in 24 hours because it can damage your liver.   Narcotics: take for moderate to severe pain. Do not drink alcohol or drive while taking narcotics. Some narcotic medications (Norco, Tylenol #3, Percocet, Vicodin) contain Tylenol (acetaminophen). Make sure to not exceed the maximum dose if you are taking additional Tylenol with these medications.  Muscle relaxers: take for muscle cramping. These can cause drowsiness.    NSAIDS (Advil, Aleve, Motrin, ibuprofen, naproxen, meloxicam, diclofenac, celecoxib, etc.) or aspirin: Do not take these unless your physician says it is ok. For a fusion, it may be several months before you can take NSAIDS.  Stool softeners: take to prevent constipation while you are taking narcotic medications. You can get these over the counter at the pharmacy. You may use laxatives, which are stronger, if needed.    If you believe you will need more of medication your surgeon has prescribed to you, request a refill through your pharmacy or through the refill request in [employer] (log in, go to medications, then select refill request) at least two business days before you run out of medication.     Nonpharmacologic pain management   You may use ice on your incision for  20 minutes every hour to help with pain and swelling. Do not place ice directly on your skin. You can use heat to sooth your muscles but avoid placing heat directly on your incision. Make frequent position changes. You can do gentle stretching while avoiding significant bending or twisting. Use deep breathing techniques and distractions such as TV, music, reading, or games.     Movement restrictions  After surgery, no bending or twisting your neck or back. Do not lift anything over 10lbs (about the weight of a gallon of milk) or lift anything over your shoulders. Avoid pulling or pushing. You may use stairs while holding the handrail.  It is ok to ride in the car but refrain from driving or traveling long distances until cleared to do so by your surgeon. You may not drive while taking narcotics or muscle relaxants. {Restrictions:28406}     Post Op Exercise   Walk frequently. Start with walking short distances and increase as you start to feel better. Do ankle pumps (bending at your ankles, bring your feet towards your head then point them towards the ground) 15-20 times every hour when awake to help prevent blood clots.     Your surgeon will let you know at your post operative appointment if you are ready to decrease your movement restrictions and increase your exercise. If you have questions in between appointments about lessening your restrictions, please contact the office.     You and your doctor will discuss how you are feeling as you heal and decide if outpatient physical therapy or a medical fitness referral is needed.    Caring for your incision  Always wash your hands before touching your incision. Your incision will be closed with sutures under the skin and skin glue or Steri-Strips (thin white bandages) on top of the skin. Do not attempt to peal off Skin glue/Steri-Strips as they will come off on their own. If the incision is closed with sutures or staples on top of the skin, they will be removed at a  post op appointment. The incision may be covered with a gauze dressing that can come off after three days. Once the original gauze dressing is removed, we prefer that you leave your incision open to air (without a gauze dressing). If the incision has drainage or is rubbing against your clothes or brace, you may place gauze and medical tape over it. Change the gauze and medical tape daily. Look at your incision daily to check for signs of infection.   You can shower three days after your surgery or sooner if your surgeon allows. We recommend the care partner be present during the first shower for safety. Let soapy water run over the incision, but do not scrub it or spray it directly. Gently pat it dry after with a clean towel. Do not apply any creams or lotions to the incision. Do not soak in a tub, pool, or any body of water until your incision is fully healed.    Signs of Infection   Check your incision daily for swelling, redness, drainage, pus, bad smell, or opening of the incision.     When to Call for Assistance  Call the Neurosurgery Office ([phone] Option #2) if you experience signs of infection, opening of the incision, continuous nausea or vomiting, poor pain control despite using the pain medication as directed, a sudden increase in pain, temperature over 101F, difficulty swallowing, leg swelling, or with any concerns, unanswered questions, or new problems, such as new numbness/weakness/tingling.  Call 911 or go to the nearest emergency room if you experience chest pain, difficulty breathing, loss of bowel or bladder control, extreme drowsiness, or any other life-threatening situation.     Follow-up Plan   Appointments with Dr. Cheek or Odilia at 1, 4 and 12 weeks  1 week pre-op surgical review scheduled on 11.6.24 at 11:20 am with Dr. Cheek.     1 week post-op appointment scheduled on 11.22.24 at 11:00 am with REAL Weaver     4 week post-op appointment scheduled on 12.11.24 at 11:20 am with  Dr. Cheek     3 month post surgery appointment scheduled on 1.6.25 at 11:30 am with REAL Weaver    Answered questions regarding ***     You can contact the RN Spine Navigator at [phone] or [email or web address] with additional questions or feedback on your care. It may take several business days to receive a reply so please do not call the RN spine navigator for refills or for emergencies.    Spine navigator spent *** minutes conducting a virtual visit to provide education. Thank you for letting the RN Spine Navigator participate in your care.

## 2024-10-31 NOTE — TELEPHONE ENCOUNTER
Clearances needed  PCP: Dr. Ham KITCHEN- Not yet received  CARDS: Dr. Karla DOTY - Received-in media  NEPHROLOGY: Dr Jones RAPHAEL-appt 10/22/24- note not yet received

## 2024-11-04 NOTE — TELEPHONE ENCOUNTER
PCP and Nephrology presurgical clearances not yet received.  Re-faxed letters to Dr Cheek and Dr Huang

## 2024-11-05 NOTE — TELEPHONE ENCOUNTER
PCP presurgical clearance received per note dated 11.4.24, \"Cleared for surgery\"    Faxed note, labs, CXR,EKG and Echo report to Sam DOMINGUEZ; sent to scan; copy placed in ppwrk sent to scan folder

## 2024-11-06 ENCOUNTER — LABORATORY ENCOUNTER (OUTPATIENT)
Dept: LAB | Facility: HOSPITAL | Age: 74
End: 2024-11-06
Attending: NEUROLOGICAL SURGERY
Payer: MEDICARE

## 2024-11-06 ENCOUNTER — OFFICE VISIT (OUTPATIENT)
Dept: SURGERY | Facility: CLINIC | Age: 74
End: 2024-11-06
Payer: MEDICARE

## 2024-11-06 VITALS — DIASTOLIC BLOOD PRESSURE: 72 MMHG | HEART RATE: 80 BPM | SYSTOLIC BLOOD PRESSURE: 160 MMHG

## 2024-11-06 DIAGNOSIS — G95.9 CERVICAL MYELOPATHY (HCC): Primary | ICD-10-CM

## 2024-11-06 DIAGNOSIS — G95.9 CERVICAL MYELOPATHY (HCC): ICD-10-CM

## 2024-11-06 PROBLEM — E11.9 DIABETES MELLITUS WITHOUT COMPLICATION (HCC): Status: ACTIVE | Noted: 2024-11-06

## 2024-11-06 PROBLEM — I13.10 HYPERTENSIVE HEART AND KIDNEY DISEASE: Status: ACTIVE | Noted: 2024-11-06

## 2024-11-06 PROBLEM — M47.816 LUMBAR SPONDYLOSIS: Status: ACTIVE | Noted: 2024-11-06

## 2024-11-06 PROBLEM — N18.9 ANEMIA IN CHRONIC KIDNEY DISEASE: Status: ACTIVE | Noted: 2024-11-06

## 2024-11-06 PROBLEM — D63.1 ANEMIA IN CHRONIC KIDNEY DISEASE: Status: ACTIVE | Noted: 2024-11-06

## 2024-11-06 PROBLEM — M51.369 DEGENERATION OF LUMBAR INTERVERTEBRAL DISC: Status: ACTIVE | Noted: 2024-11-06

## 2024-11-06 PROBLEM — E11.21 DIABETIC RENAL DISEASE (HCC): Status: ACTIVE | Noted: 2024-11-06

## 2024-11-06 PROBLEM — R29.818 NEUROGENIC CLAUDICATION: Status: ACTIVE | Noted: 2024-11-06

## 2024-11-06 LAB
ANTIBODY SCREEN: NEGATIVE
RH BLOOD TYPE: POSITIVE

## 2024-11-06 PROCEDURE — 86900 BLOOD TYPING SEROLOGIC ABO: CPT

## 2024-11-06 PROCEDURE — 86901 BLOOD TYPING SEROLOGIC RH(D): CPT

## 2024-11-06 PROCEDURE — 86850 RBC ANTIBODY SCREEN: CPT

## 2024-11-06 PROCEDURE — 99213 OFFICE O/P EST LOW 20 MIN: CPT | Performed by: NEUROLOGICAL SURGERY

## 2024-11-06 RX ORDER — FUROSEMIDE 20 MG/1
TABLET ORAL
COMMUNITY

## 2024-11-06 RX ORDER — METOPROLOL TARTRATE 50 MG
TABLET ORAL
COMMUNITY

## 2024-11-06 RX ORDER — IBUPROFEN 600 MG/1
TABLET, FILM COATED ORAL
COMMUNITY

## 2024-11-06 RX ORDER — FUROSEMIDE 40 MG/1
1 TABLET ORAL DAILY
COMMUNITY

## 2024-11-06 RX ORDER — DEXTROAMPHETAMINE SACCHARATE, AMPHETAMINE ASPARTATE, DEXTROAMPHETAMINE SULFATE AND AMPHETAMINE SULFATE 2.5; 2.5; 2.5; 2.5 MG/1; MG/1; MG/1; MG/1
TABLET ORAL
COMMUNITY

## 2024-11-06 RX ORDER — CYCLOBENZAPRINE HCL 10 MG
TABLET ORAL
COMMUNITY

## 2024-11-06 NOTE — TELEPHONE ENCOUNTER
Case change request placed changing surgery from C3-C7 ACDF to Posterior cervical 3-7 laminectomy and fusion from cervical 3-thoracic 2 with lateral mass and pedicle screws and bone graft.

## 2024-11-06 NOTE — PATIENT INSTRUCTIONS
Refill policies:    Allow 2-3 business days for refills; controlled substances may take longer.  Contact your pharmacy at least 5 days prior to running out of medication and have them send an electronic request or submit request through the “request refill” option in your [employer] account.  Refills are not addressed on weekends; covering physicians do not authorize routine medications on weekends.  No narcotics or controlled substances are refilled after noon on Fridays or by on call physicians.  By law, narcotics must be electronically prescribed.  A 30 day supply with no refills is the maximum allowed.  If your prescription is due for a refill, you may be due for a follow up appointment.  To best provide you care, patients receiving routine medications need to be seen at least once a year.  Patients receiving narcotic/controlled substance medications need to be seen at least once every 3 months.  In the event that your preferred pharmacy does not have the requested medication in stock (e.g. Backordered), it is your responsibility to find another pharmacy that has the requested medication available.  We will gladly send a new prescription to that pharmacy at your request.    Scheduling Tests:    If your physician has ordered radiology tests such as MRI or CT scans, please contact Central Scheduling at [phone] right away to schedule the test.  Once scheduled, the [facility] Centralized Referral Team will work with your insurance carrier to obtain pre-certification or prior authorization.  Depending on your insurance carrier, approval may take 3-10 days.  It is highly recommended patients assure they have received an authorization before having a test performed.  If test is done without insurance authorization, patient may be responsible for the entire amount billed.      Precertification and Prior Authorizations:  If your physician has recommended that you have a procedure or additional testing performed the [facility]  Centralized Referral Team will contact your insurance carrier to obtain pre-certification or prior authorization.    You are strongly encouraged to contact your insurance carrier to verify that your procedure/test has been approved and is a COVERED benefit.  Although the [facility] Centralized Referral Team does its due diligence, the insurance carrier gives the disclaimer that \"Although the procedure is authorized, this does not guarantee payment.\"    Ultimately the patient is responsible for payment.   Thank you for your understanding in this matter.  Paperwork Completion:  If you require FMLA or disability paperwork for your recovery, please make sure to either drop it off or have it faxed to our office at [phone]. Be sure the form has your name and date of birth on it.  The form will be faxed to our Forms Department and they will complete it for you.  There is a 25$ fee for all forms that need to be filled out.  Please be aware there is a 10-14 day turnaround time.  You will need to sign a release of information (MIKAELA) form if your paperwork does not come with one.  You may call the Forms Department with any questions at [phone].  Their fax number is [phone].

## 2024-11-06 NOTE — PROGRESS NOTES
Pre op   anterior cervical discectomy and fusion at cervical 3-4, cervical 4-5, cervical 5-6, and cervical 6-7 with cage, bone graft, plate and screws Right General   INTRAOPERATIVE NEURO MONITORING          Sx on 11.14.2024    Pain score  1/10

## 2024-11-06 NOTE — H&P (VIEW-ONLY)
[facility]  Neurological Surgery Clinic Note    Dacia Bran  3/20/1950  [id number]  PCP: ASHLEY URIOSTEGUI    REASON FOR VISIT:  Neck pain and UE radiculopathy    HISTORY OF PRESENT ILLNESS:  Dacia Bran is a(n) 74 year old female with neck pain and UE radiculopathy as well as gait instability.  Patient uses a walker and wheelchair.  MRI shows severe spinal stenosis from C3-T1 with cervical kyphosis and bowstring effect.  Upon flex ex her kyphosis corrects.  Discussed anterior surgery but with C7-T1 stenosis do not feel this would be adeuqate therefore recommend C3-C7 lami and C3-T2 fusion.  Risks and benefits discussed at length. Recommend rehab post surgery.      Location: neck pain  Quality: UE weakness and gait instiability  Severity: progressive  Duration: months  Timing: any  Context: severe spinal stenosis within cervical spine  Modifying Factors: none   Associated signs/symptoms: pain      PAST MEDICAL HISTORY:  Past Medical History:    Anesthesia complication    post op confusion, prolonged wake time    Anxiety state    Cervical myelopathy (HCC)    Chronic kidney disease, stage 3b (HCC)    Depression    Diabetes (HCC)    Essential hypertension    High blood pressure    High cholesterol    Renal disorder    Shortness of breath    excertional    Visual impairment    glasses       PAST SURGICAL HISTORY:  Past Surgical History:   Procedure Laterality Date    Hysterectomy      Lumbar spine surgery  2022       FAMILY HISTORY:  family history is not on file.    SOCIAL HISTORY:   reports that she has quit smoking. Her smoking use included cigarettes. She has never used smokeless tobacco. She reports that she does not drink alcohol and does not use drugs.    ALLERGIES:  Allergies[1]    MEDICATIONS:  Medications Ordered Prior to Encounter[2]    REVIEW OF SYSTEMS:  Review of Systems   All other systems reviewed and are negative.       PHYSICAL EXAMINATION:  Neurological Exam  Mental Status  Awake, alert  and oriented to person, place and time.    Cranial Nerves  CN II: Visual acuity is normal. Visual fields full to confrontation.  CN III, IV, VI: Extraocular movements intact bilaterally. Normal lids and orbits bilaterally. Pupils equal round and reactive to light bilaterally.  CN V: Facial sensation is normal.  CN VII: Full and symmetric facial movement.  CN VIII: Hearing is normal.  CN IX, X: Palate elevates symmetrically. Normal gag reflex.  CN XI: Shoulder shrug strength is normal.  CN XII: Tongue midline without atrophy or fasciculations.    Motor  Normal muscle bulk throughout. No fasciculations present. Normal muscle tone. No abnormal involuntary movements. Strength is 5/5 throughout all four extremities.    Sensory  Sensation is intact to light touch, pinprick, vibration and proprioception in all four extremities.    Reflexes  Deep tendon reflexes are 2+ and symmetric in all four extremities.    Coordination    Finger-to-nose, rapid alternating movements and heel-to-shin normal bilaterally without dysmetria.    Gait  Normal casual, toe, heel and tandem gait.       IMAGING:  As above    ASSESSMENT:  Cervical myelopathy    Plan:  Plan for C3-C7 laminectomy and C3-T2 fusion-risks and benefits discussed at length      The plan of care was discussed with the patient at length. All questions and concerns were addressed at this time. The patient verbalized agreement with the plan and was appreciative.     Total visit time: 30 minutes; More than 50% spent coordinating care, counseling, reviewing imaging and discussing medication therapy.     Wei Cheek, DO  Neurological Surgery  [facility]           [1]   Allergies  Allergen Reactions    Methotrexate OTHER (SEE COMMENTS)     Swelling   [2]   Current Outpatient Medications on File Prior to Visit   Medication Sig Dispense Refill    amphetamine-dextroamphetamine (ADDERALL) 10 MG Oral Tab 1 tablet Orally Twice a day      cyclobenzaprine 10 MG Oral Tab  1 tablet at bedtime as needed Orally Once a day      furosemide 20 MG Oral Tab 1 tab(s) orally once a day      furosemide 40 MG Oral Tab Take 1 tablet (40 mg total) by mouth daily.      metoprolol tartrate 50 MG Oral Tab TK 1 T PO BID Oral for 90      omeprazole 20 MG Oral Capsule Delayed Release 1 capsule 30 minutes before morning meal Orally Once a day      montelukast 10 MG Oral Tab Take 1 tablet (10 mg total) by mouth nightly.      donepezil 10 MG Oral Tab Take 1 tablet (10 mg total) by mouth nightly.      levothyroxine 100 MCG Oral Tab Take 1 tablet (100 mcg total) by mouth before breakfast.      amLODIPine 5 MG Oral Tab Take 1 tablet (5 mg total) by mouth daily.      buPROPion  MG Oral Tablet 24 Hr Take 1 tablet (300 mg total) by mouth daily. 450 mg daily      buPROPion  MG Oral Tablet 24 Hr Take 1 tablet (150 mg total) by mouth daily.      cloNIDine 0.1 MG Oral Tab Take 1 tablet (0.1 mg total) by mouth 4 (four) times a week. Sunday, Monday, wed, and friday      enalapril 20 MG Oral Tab Take 1 tablet (20 mg total) by mouth 2 (two) times daily.      fenofibrate 145 MG Oral Tab Take 1 tablet (145 mg total) by mouth daily.      Potassium Chloride ER 10 MEQ Oral Tab CR Take 2 tablets (20 mEq total) by mouth 2 (two) times daily.      DULoxetine 60 MG Oral Cap DR Particles Take 1 capsule (60 mg total) by mouth daily. 30 mg in evening      folic acid 1 MG Oral Tab Take by mouth daily.      ferrous sulfate 325 (65 FE) MG Oral Tab EC Take 1 tablet (325 mg total) by mouth daily with breakfast.      memantine 5 MG Oral Tab Take 1 tablet (5 mg total) by mouth daily.      labetalol 100 MG Oral Tab Take 1 tablet (100 mg total) by mouth daily.      Cholecalciferol 125 MCG (5000 UT) Oral Tab Take 1 tablet (5,000 Units total) by mouth daily.      Biotin 1000 MCG Oral Tab Take 1,000 mcg by mouth Noon.      ibuprofen 600 MG Oral Tab TK 1 T PO TID Oral for 90 (Patient not taking: Reported on 11/6/2024)      metFORMIN  500 MG Oral Tab TK 1 T PO TID WC Oral for 90 (Patient not taking: Reported on 11/6/2024)      hydroxychloroquine 200 MG Oral Tab Take 1 tablet (200 mg total) by mouth 2 (two) times daily. (Patient not taking: Reported on 11/6/2024)       No current facility-administered medications on file prior to visit.

## 2024-11-06 NOTE — PROGRESS NOTES
[facility]  Neurological Surgery Clinic Note    Dacia Bran  3/20/1950  [id number]  PCP: ASHLEY URIOSTEGUI    REASON FOR VISIT:  Neck pain and UE radiculopathy    HISTORY OF PRESENT ILLNESS:  Dacia Bran is a(n) 74 year old female with neck pain and UE radiculopathy as well as gait instability.  Patient uses a walker and wheelchair.  MRI shows severe spinal stenosis from C3-T1 with cervical kyphosis and bowstring effect.  Upon flex ex her kyphosis corrects.  Discussed anterior surgery but with C7-T1 stenosis do not feel this would be adeuqate therefore recommend C3-C7 lami and C3-T2 fusion.  Risks and benefits discussed at length. Recommend rehab post surgery.      Location: neck pain  Quality: UE weakness and gait instiability  Severity: progressive  Duration: months  Timing: any  Context: severe spinal stenosis within cervical spine  Modifying Factors: none   Associated signs/symptoms: pain      PAST MEDICAL HISTORY:  Past Medical History:    Anesthesia complication    post op confusion, prolonged wake time    Anxiety state    Cervical myelopathy (HCC)    Chronic kidney disease, stage 3b (HCC)    Depression    Diabetes (HCC)    Essential hypertension    High blood pressure    High cholesterol    Renal disorder    Shortness of breath    excertional    Visual impairment    glasses       PAST SURGICAL HISTORY:  Past Surgical History:   Procedure Laterality Date    Hysterectomy      Lumbar spine surgery  2022       FAMILY HISTORY:  family history is not on file.    SOCIAL HISTORY:   reports that she has quit smoking. Her smoking use included cigarettes. She has never used smokeless tobacco. She reports that she does not drink alcohol and does not use drugs.    ALLERGIES:  Allergies[1]    MEDICATIONS:  Medications Ordered Prior to Encounter[2]    REVIEW OF SYSTEMS:  Review of Systems   All other systems reviewed and are negative.       PHYSICAL EXAMINATION:  Neurological Exam  Mental Status  Awake, alert  and oriented to person, place and time.    Cranial Nerves  CN II: Visual acuity is normal. Visual fields full to confrontation.  CN III, IV, VI: Extraocular movements intact bilaterally. Normal lids and orbits bilaterally. Pupils equal round and reactive to light bilaterally.  CN V: Facial sensation is normal.  CN VII: Full and symmetric facial movement.  CN VIII: Hearing is normal.  CN IX, X: Palate elevates symmetrically. Normal gag reflex.  CN XI: Shoulder shrug strength is normal.  CN XII: Tongue midline without atrophy or fasciculations.    Motor  Normal muscle bulk throughout. No fasciculations present. Normal muscle tone. No abnormal involuntary movements. Strength is 5/5 throughout all four extremities.    Sensory  Sensation is intact to light touch, pinprick, vibration and proprioception in all four extremities.    Reflexes  Deep tendon reflexes are 2+ and symmetric in all four extremities.    Coordination    Finger-to-nose, rapid alternating movements and heel-to-shin normal bilaterally without dysmetria.    Gait  Normal casual, toe, heel and tandem gait.       IMAGING:  As above    ASSESSMENT:  Cervical myelopathy    Plan:  Plan for C3-C7 laminectomy and C3-T2 fusion-risks and benefits discussed at length      The plan of care was discussed with the patient at length. All questions and concerns were addressed at this time. The patient verbalized agreement with the plan and was appreciative.     Total visit time: 30 minutes; More than 50% spent coordinating care, counseling, reviewing imaging and discussing medication therapy.     Wei Cheek, DO  Neurological Surgery  On license of [facility]           [1]   Allergies  Allergen Reactions    Methotrexate OTHER (SEE COMMENTS)     Swelling   [2]   Current Outpatient Medications on File Prior to Visit   Medication Sig Dispense Refill    amphetamine-dextroamphetamine (ADDERALL) 10 MG Oral Tab 1 tablet Orally Twice a day      cyclobenzaprine 10 MG Oral Tab  1 tablet at bedtime as needed Orally Once a day      furosemide 20 MG Oral Tab 1 tab(s) orally once a day      furosemide 40 MG Oral Tab Take 1 tablet (40 mg total) by mouth daily.      metoprolol tartrate 50 MG Oral Tab TK 1 T PO BID Oral for 90      omeprazole 20 MG Oral Capsule Delayed Release 1 capsule 30 minutes before morning meal Orally Once a day      montelukast 10 MG Oral Tab Take 1 tablet (10 mg total) by mouth nightly.      donepezil 10 MG Oral Tab Take 1 tablet (10 mg total) by mouth nightly.      levothyroxine 100 MCG Oral Tab Take 1 tablet (100 mcg total) by mouth before breakfast.      amLODIPine 5 MG Oral Tab Take 1 tablet (5 mg total) by mouth daily.      buPROPion  MG Oral Tablet 24 Hr Take 1 tablet (300 mg total) by mouth daily. 450 mg daily      buPROPion  MG Oral Tablet 24 Hr Take 1 tablet (150 mg total) by mouth daily.      cloNIDine 0.1 MG Oral Tab Take 1 tablet (0.1 mg total) by mouth 4 (four) times a week. Sunday, Monday, wed, and friday      enalapril 20 MG Oral Tab Take 1 tablet (20 mg total) by mouth 2 (two) times daily.      fenofibrate 145 MG Oral Tab Take 1 tablet (145 mg total) by mouth daily.      Potassium Chloride ER 10 MEQ Oral Tab CR Take 2 tablets (20 mEq total) by mouth 2 (two) times daily.      DULoxetine 60 MG Oral Cap DR Particles Take 1 capsule (60 mg total) by mouth daily. 30 mg in evening      folic acid 1 MG Oral Tab Take by mouth daily.      ferrous sulfate 325 (65 FE) MG Oral Tab EC Take 1 tablet (325 mg total) by mouth daily with breakfast.      memantine 5 MG Oral Tab Take 1 tablet (5 mg total) by mouth daily.      labetalol 100 MG Oral Tab Take 1 tablet (100 mg total) by mouth daily.      Cholecalciferol 125 MCG (5000 UT) Oral Tab Take 1 tablet (5,000 Units total) by mouth daily.      Biotin 1000 MCG Oral Tab Take 1,000 mcg by mouth Noon.      ibuprofen 600 MG Oral Tab TK 1 T PO TID Oral for 90 (Patient not taking: Reported on 11/6/2024)      metFORMIN  500 MG Oral Tab TK 1 T PO TID WC Oral for 90 (Patient not taking: Reported on 11/6/2024)      hydroxychloroquine 200 MG Oral Tab Take 1 tablet (200 mg total) by mouth 2 (two) times daily. (Patient not taking: Reported on 11/6/2024)       No current facility-administered medications on file prior to visit.

## 2024-11-07 ENCOUNTER — MED REC SCAN ONLY (OUTPATIENT)
Dept: SURGERY | Facility: CLINIC | Age: 74
End: 2024-11-07

## 2024-11-07 NOTE — TELEPHONE ENCOUNTER
Case change request placed with new CPT codes for change in procedure. Confirmed CPT codes with JAIME Maher.     CPT Codes: 22928, 36043, 92704, 20861, 59935, 33954, 94070, 85093.

## 2024-11-11 NOTE — TELEPHONE ENCOUNTER
Message below noted.    Called [facility] to clarify if they were requesting orders or results. They were advised by pt son that pt would be obtaining labs, EKG, and CXR from PCP.    Advised we are still waiting on clearance from PCP along with results and we will send once received.

## 2024-11-11 NOTE — TELEPHONE ENCOUNTER
Labs received on 11.7.24 and faxed to [facility] same day. They are scanned in chart under Medial tab

## 2024-11-13 NOTE — PAT NURSING NOTE
U/A reflex and MRSA labs done yesterday (CBC/Urine reflex and MSSA/MRSA omitted per pcp office with original testing). Urine culture pending. CBC was not done again-ordered stat on admit.

## 2024-11-14 ENCOUNTER — APPOINTMENT (OUTPATIENT)
Dept: GENERAL RADIOLOGY | Facility: HOSPITAL | Age: 74
End: 2024-11-14
Attending: NEUROLOGICAL SURGERY
Payer: MEDICARE

## 2024-11-14 ENCOUNTER — ANESTHESIA EVENT (OUTPATIENT)
Dept: SURGERY | Facility: HOSPITAL | Age: 74
End: 2024-11-14
Payer: MEDICARE

## 2024-11-14 ENCOUNTER — ANESTHESIA (OUTPATIENT)
Dept: SURGERY | Facility: HOSPITAL | Age: 74
End: 2024-11-14
Payer: MEDICARE

## 2024-11-14 ENCOUNTER — HOSPITAL ENCOUNTER (INPATIENT)
Facility: HOSPITAL | Age: 74
LOS: 4 days | Discharge: NURSING, REHAB OR LONG-TERM CARE | End: 2024-11-18
Attending: NEUROLOGICAL SURGERY | Admitting: NEUROLOGICAL SURGERY
Payer: MEDICARE

## 2024-11-14 DIAGNOSIS — G95.9 CERVICAL MYELOPATHY (HCC): ICD-10-CM

## 2024-11-14 DIAGNOSIS — Z98.1 S/P CERVICAL SPINAL FUSION: Primary | ICD-10-CM

## 2024-11-14 PROBLEM — I10 PRIMARY HYPERTENSION: Status: ACTIVE | Noted: 2024-11-14

## 2024-11-14 PROBLEM — E78.2 MIXED HYPERLIPIDEMIA: Status: ACTIVE | Noted: 2024-11-14

## 2024-11-14 LAB
BASOPHILS # BLD AUTO: 0.04 X10(3) UL (ref 0–0.2)
BASOPHILS NFR BLD AUTO: 0.5 %
EOSINOPHIL # BLD AUTO: 0.13 X10(3) UL (ref 0–0.7)
EOSINOPHIL NFR BLD AUTO: 1.7 %
ERYTHROCYTE [DISTWIDTH] IN BLOOD BY AUTOMATED COUNT: 13.9 %
GLUCOSE BLD-MCNC: 124 MG/DL (ref 70–99)
GLUCOSE BLD-MCNC: 147 MG/DL (ref 70–99)
HCT VFR BLD AUTO: 34.1 %
HGB BLD-MCNC: 10.9 G/DL
IMM GRANULOCYTES # BLD AUTO: 0.02 X10(3) UL (ref 0–1)
IMM GRANULOCYTES NFR BLD: 0.3 %
LYMPHOCYTES # BLD AUTO: 1.37 X10(3) UL (ref 1–4)
LYMPHOCYTES NFR BLD AUTO: 17.7 %
MCH RBC QN AUTO: 30.9 PG (ref 26–34)
MCHC RBC AUTO-ENTMCNC: 32 G/DL (ref 31–37)
MCV RBC AUTO: 96.6 FL
MONOCYTES # BLD AUTO: 0.51 X10(3) UL (ref 0.1–1)
MONOCYTES NFR BLD AUTO: 6.6 %
NEUTROPHILS # BLD AUTO: 5.66 X10 (3) UL (ref 1.5–7.7)
NEUTROPHILS # BLD AUTO: 5.66 X10(3) UL (ref 1.5–7.7)
NEUTROPHILS NFR BLD AUTO: 73.2 %
PLATELET # BLD AUTO: 349 10(3)UL (ref 150–450)
RBC # BLD AUTO: 3.53 X10(6)UL
RH BLOOD TYPE: POSITIVE
WBC # BLD AUTO: 7.7 X10(3) UL (ref 4–11)

## 2024-11-14 PROCEDURE — 99223 1ST HOSP IP/OBS HIGH 75: CPT | Performed by: STUDENT IN AN ORGANIZED HEALTH CARE EDUCATION/TRAINING PROGRAM

## 2024-11-14 PROCEDURE — 01N10ZZ RELEASE CERVICAL NERVE, OPEN APPROACH: ICD-10-PCS | Performed by: NEUROLOGICAL SURGERY

## 2024-11-14 PROCEDURE — 0RG2071 FUSION OF 2 OR MORE CERVICAL VERTEBRAL JOINTS WITH AUTOLOGOUS TISSUE SUBSTITUTE, POSTERIOR APPROACH, POSTERIOR COLUMN, OPEN APPROACH: ICD-10-PCS | Performed by: NEUROLOGICAL SURGERY

## 2024-11-14 PROCEDURE — 76000 FLUOROSCOPY <1 HR PHYS/QHP: CPT | Performed by: NEUROLOGICAL SURGERY

## 2024-11-14 PROCEDURE — 0RG4071 FUSION OF CERVICOTHORACIC VERTEBRAL JOINT WITH AUTOLOGOUS TISSUE SUBSTITUTE, POSTERIOR APPROACH, POSTERIOR COLUMN, OPEN APPROACH: ICD-10-PCS | Performed by: NEUROLOGICAL SURGERY

## 2024-11-14 PROCEDURE — 01N80ZZ RELEASE THORACIC NERVE, OPEN APPROACH: ICD-10-PCS | Performed by: NEUROLOGICAL SURGERY

## 2024-11-14 PROCEDURE — 0RG6071 FUSION OF THORACIC VERTEBRAL JOINT WITH AUTOLOGOUS TISSUE SUBSTITUTE, POSTERIOR APPROACH, POSTERIOR COLUMN, OPEN APPROACH: ICD-10-PCS | Performed by: NEUROLOGICAL SURGERY

## 2024-11-14 PROCEDURE — 4A11X4G MONITORING OF PERIPHERAL NERVOUS ELECTRICAL ACTIVITY, INTRAOPERATIVE, EXTERNAL APPROACH: ICD-10-PCS | Performed by: NEUROLOGICAL SURGERY

## 2024-11-14 RX ORDER — HYDROCHLOROTHIAZIDE 25 MG/1
25 TABLET ORAL DAILY
COMMUNITY

## 2024-11-14 RX ORDER — DULOXETIN HYDROCHLORIDE 30 MG/1
60 CAPSULE, DELAYED RELEASE ORAL DAILY
Status: DISCONTINUED | OUTPATIENT
Start: 2024-11-15 | End: 2024-11-18

## 2024-11-14 RX ORDER — PANTOPRAZOLE SODIUM 20 MG/1
20 TABLET, DELAYED RELEASE ORAL
Status: DISCONTINUED | OUTPATIENT
Start: 2024-11-15 | End: 2024-11-18

## 2024-11-14 RX ORDER — DULOXETIN HYDROCHLORIDE 30 MG/1
30 CAPSULE, DELAYED RELEASE ORAL EVERY EVENING
Status: DISCONTINUED | OUTPATIENT
Start: 2024-11-14 | End: 2024-11-18

## 2024-11-14 RX ORDER — DIPHENHYDRAMINE HYDROCHLORIDE 50 MG/ML
25 INJECTION INTRAMUSCULAR; INTRAVENOUS EVERY 4 HOURS PRN
Status: DISCONTINUED | OUTPATIENT
Start: 2024-11-14 | End: 2024-11-18

## 2024-11-14 RX ORDER — HYDROCODONE BITARTRATE AND ACETAMINOPHEN 5; 325 MG/1; MG/1
1 TABLET ORAL ONCE AS NEEDED
Status: DISCONTINUED | OUTPATIENT
Start: 2024-11-14 | End: 2024-11-14 | Stop reason: HOSPADM

## 2024-11-14 RX ORDER — SODIUM CHLORIDE, SODIUM LACTATE, POTASSIUM CHLORIDE, CALCIUM CHLORIDE 600; 310; 30; 20 MG/100ML; MG/100ML; MG/100ML; MG/100ML
INJECTION, SOLUTION INTRAVENOUS CONTINUOUS
Status: DISCONTINUED | OUTPATIENT
Start: 2024-11-14 | End: 2024-11-14 | Stop reason: HOSPADM

## 2024-11-14 RX ORDER — ENALAPRIL MALEATE 10 MG/1
20 TABLET ORAL 2 TIMES DAILY
Status: DISCONTINUED | OUTPATIENT
Start: 2024-11-14 | End: 2024-11-18

## 2024-11-14 RX ORDER — DIPHENHYDRAMINE HCL 25 MG
25 CAPSULE ORAL EVERY 4 HOURS PRN
Status: DISCONTINUED | OUTPATIENT
Start: 2024-11-14 | End: 2024-11-18

## 2024-11-14 RX ORDER — HYDRALAZINE HYDROCHLORIDE 20 MG/ML
INJECTION INTRAMUSCULAR; INTRAVENOUS AS NEEDED
Status: DISCONTINUED | OUTPATIENT
Start: 2024-11-14 | End: 2024-11-14 | Stop reason: SURG

## 2024-11-14 RX ORDER — MEPERIDINE HYDROCHLORIDE 25 MG/ML
12.5 INJECTION INTRAMUSCULAR; INTRAVENOUS; SUBCUTANEOUS AS NEEDED
Status: DISCONTINUED | OUTPATIENT
Start: 2024-11-14 | End: 2024-11-14 | Stop reason: HOSPADM

## 2024-11-14 RX ORDER — ACETAMINOPHEN 500 MG
1000 TABLET ORAL ONCE
Status: DISCONTINUED | OUTPATIENT
Start: 2024-11-14 | End: 2024-11-14 | Stop reason: HOSPADM

## 2024-11-14 RX ORDER — POLYETHYLENE GLYCOL 3350 17 G/17G
17 POWDER, FOR SOLUTION ORAL DAILY PRN
Status: DISCONTINUED | OUTPATIENT
Start: 2024-11-14 | End: 2024-11-18

## 2024-11-14 RX ORDER — SODIUM CHLORIDE 9 MG/ML
INJECTION, SOLUTION INTRAVENOUS CONTINUOUS
Status: DISCONTINUED | OUTPATIENT
Start: 2024-11-14 | End: 2024-11-15

## 2024-11-14 RX ORDER — TRANEXAMIC ACID 10 MG/ML
INJECTION, SOLUTION INTRAVENOUS AS NEEDED
Status: DISCONTINUED | OUTPATIENT
Start: 2024-11-14 | End: 2024-11-14 | Stop reason: SURG

## 2024-11-14 RX ORDER — NICOTINE POLACRILEX 4 MG
15 LOZENGE BUCCAL
Status: DISCONTINUED | OUTPATIENT
Start: 2024-11-14 | End: 2024-11-14 | Stop reason: HOSPADM

## 2024-11-14 RX ORDER — SENNOSIDES 8.6 MG
17.2 TABLET ORAL NIGHTLY
Status: DISCONTINUED | OUTPATIENT
Start: 2024-11-14 | End: 2024-11-18

## 2024-11-14 RX ORDER — DONEPEZIL HYDROCHLORIDE 10 MG/1
10 TABLET, FILM COATED ORAL NIGHTLY
Status: DISCONTINUED | OUTPATIENT
Start: 2024-11-14 | End: 2024-11-18

## 2024-11-14 RX ORDER — DIAZEPAM 5 MG/1
5 TABLET ORAL EVERY 8 HOURS PRN
Status: DISCONTINUED | OUTPATIENT
Start: 2024-11-14 | End: 2024-11-18

## 2024-11-14 RX ORDER — HYDROCODONE BITARTRATE AND ACETAMINOPHEN 5; 325 MG/1; MG/1
2 TABLET ORAL ONCE AS NEEDED
Status: DISCONTINUED | OUTPATIENT
Start: 2024-11-14 | End: 2024-11-14 | Stop reason: HOSPADM

## 2024-11-14 RX ORDER — LABETALOL 100 MG/1
100 TABLET, FILM COATED ORAL EVERY MORNING
Status: DISCONTINUED | OUTPATIENT
Start: 2024-11-15 | End: 2024-11-18

## 2024-11-14 RX ORDER — AMLODIPINE BESYLATE 5 MG/1
5 TABLET ORAL DAILY
Status: DISCONTINUED | OUTPATIENT
Start: 2024-11-14 | End: 2024-11-18

## 2024-11-14 RX ORDER — METHOCARBAMOL 100 MG/ML
1000 INJECTION, SOLUTION INTRAMUSCULAR; INTRAVENOUS EVERY 8 HOURS
Status: DISPENSED | OUTPATIENT
Start: 2024-11-14 | End: 2024-11-17

## 2024-11-14 RX ORDER — SODIUM CHLORIDE, SODIUM LACTATE, POTASSIUM CHLORIDE, CALCIUM CHLORIDE 600; 310; 30; 20 MG/100ML; MG/100ML; MG/100ML; MG/100ML
INJECTION, SOLUTION INTRAVENOUS CONTINUOUS
Status: DISCONTINUED | OUTPATIENT
Start: 2024-11-14 | End: 2024-11-15

## 2024-11-14 RX ORDER — KETOROLAC TROMETHAMINE 30 MG/ML
30 INJECTION, SOLUTION INTRAMUSCULAR; INTRAVENOUS EVERY 6 HOURS PRN
Status: DISCONTINUED | OUTPATIENT
Start: 2024-11-14 | End: 2024-11-14

## 2024-11-14 RX ORDER — LABETALOL HYDROCHLORIDE 5 MG/ML
INJECTION, SOLUTION INTRAVENOUS AS NEEDED
Status: DISCONTINUED | OUTPATIENT
Start: 2024-11-14 | End: 2024-11-14 | Stop reason: SURG

## 2024-11-14 RX ORDER — DEXAMETHASONE SODIUM PHOSPHATE 4 MG/ML
4 VIAL (ML) INJECTION EVERY 6 HOURS
Status: COMPLETED | OUTPATIENT
Start: 2024-11-14 | End: 2024-11-15

## 2024-11-14 RX ORDER — BISACODYL 10 MG
10 SUPPOSITORY, RECTAL RECTAL
Status: DISCONTINUED | OUTPATIENT
Start: 2024-11-14 | End: 2024-11-18

## 2024-11-14 RX ORDER — HYDROMORPHONE HYDROCHLORIDE 1 MG/ML
0.6 INJECTION, SOLUTION INTRAMUSCULAR; INTRAVENOUS; SUBCUTANEOUS EVERY 5 MIN PRN
Status: DISCONTINUED | OUTPATIENT
Start: 2024-11-14 | End: 2024-11-14 | Stop reason: HOSPADM

## 2024-11-14 RX ORDER — METOCLOPRAMIDE HYDROCHLORIDE 5 MG/ML
10 INJECTION INTRAMUSCULAR; INTRAVENOUS EVERY 8 HOURS PRN
Status: DISCONTINUED | OUTPATIENT
Start: 2024-11-14 | End: 2024-11-17

## 2024-11-14 RX ORDER — NALOXONE HYDROCHLORIDE 0.4 MG/ML
0.08 INJECTION, SOLUTION INTRAMUSCULAR; INTRAVENOUS; SUBCUTANEOUS
Status: DISCONTINUED | OUTPATIENT
Start: 2024-11-14 | End: 2024-11-15

## 2024-11-14 RX ORDER — BUPIVACAINE HYDROCHLORIDE AND EPINEPHRINE 5; 5 MG/ML; UG/ML
INJECTION, SOLUTION EPIDURAL; INTRACAUDAL; PERINEURAL AS NEEDED
Status: DISCONTINUED | OUTPATIENT
Start: 2024-11-14 | End: 2024-11-14 | Stop reason: HOSPADM

## 2024-11-14 RX ORDER — DEXTROSE MONOHYDRATE 25 G/50ML
50 INJECTION, SOLUTION INTRAVENOUS
Status: DISCONTINUED | OUTPATIENT
Start: 2024-11-14 | End: 2024-11-14 | Stop reason: HOSPADM

## 2024-11-14 RX ORDER — ONDANSETRON 2 MG/ML
4 INJECTION INTRAMUSCULAR; INTRAVENOUS EVERY 6 HOURS PRN
Status: DISCONTINUED | OUTPATIENT
Start: 2024-11-14 | End: 2024-11-15

## 2024-11-14 RX ORDER — HYDROMORPHONE HYDROCHLORIDE 1 MG/ML
0.4 INJECTION, SOLUTION INTRAMUSCULAR; INTRAVENOUS; SUBCUTANEOUS EVERY 5 MIN PRN
Status: DISCONTINUED | OUTPATIENT
Start: 2024-11-14 | End: 2024-11-14 | Stop reason: HOSPADM

## 2024-11-14 RX ORDER — ONDANSETRON 2 MG/ML
4 INJECTION INTRAMUSCULAR; INTRAVENOUS EVERY 6 HOURS PRN
Status: DISCONTINUED | OUTPATIENT
Start: 2024-11-14 | End: 2024-11-14 | Stop reason: HOSPADM

## 2024-11-14 RX ORDER — HYDROMORPHONE HYDROCHLORIDE 1 MG/ML
0.2 INJECTION, SOLUTION INTRAMUSCULAR; INTRAVENOUS; SUBCUTANEOUS EVERY 5 MIN PRN
Status: DISCONTINUED | OUTPATIENT
Start: 2024-11-14 | End: 2024-11-14 | Stop reason: HOSPADM

## 2024-11-14 RX ORDER — HYDROMORPHONE HYDROCHLORIDE 1 MG/ML
INJECTION, SOLUTION INTRAMUSCULAR; INTRAVENOUS; SUBCUTANEOUS
Status: COMPLETED
Start: 2024-11-14 | End: 2024-11-14

## 2024-11-14 RX ORDER — HYDROCHLOROTHIAZIDE 25 MG/1
25 TABLET ORAL DAILY
Status: DISCONTINUED | OUTPATIENT
Start: 2024-11-15 | End: 2024-11-18

## 2024-11-14 RX ORDER — METHOCARBAMOL 100 MG/ML
INJECTION, SOLUTION INTRAMUSCULAR; INTRAVENOUS
Status: COMPLETED
Start: 2024-11-14 | End: 2024-11-14

## 2024-11-14 RX ORDER — ONDANSETRON 2 MG/ML
4 INJECTION INTRAMUSCULAR; INTRAVENOUS EVERY 6 HOURS PRN
Status: DISCONTINUED | OUTPATIENT
Start: 2024-11-14 | End: 2024-11-18

## 2024-11-14 RX ORDER — MONTELUKAST SODIUM 10 MG/1
10 TABLET ORAL NIGHTLY
Status: DISCONTINUED | OUTPATIENT
Start: 2024-11-14 | End: 2024-11-18

## 2024-11-14 RX ORDER — NICOTINE POLACRILEX 4 MG
30 LOZENGE BUCCAL
Status: DISCONTINUED | OUTPATIENT
Start: 2024-11-14 | End: 2024-11-14 | Stop reason: HOSPADM

## 2024-11-14 RX ORDER — FENOFIBRATE 134 MG/1
134 CAPSULE ORAL
Status: DISCONTINUED | OUTPATIENT
Start: 2024-11-15 | End: 2024-11-18

## 2024-11-14 RX ORDER — SODIUM CHLORIDE 9 MG/ML
INJECTION, SOLUTION INTRAVENOUS CONTINUOUS
Status: DISCONTINUED | OUTPATIENT
Start: 2024-11-14 | End: 2024-11-18

## 2024-11-14 RX ORDER — ROCURONIUM BROMIDE 10 MG/ML
INJECTION, SOLUTION INTRAVENOUS AS NEEDED
Status: DISCONTINUED | OUTPATIENT
Start: 2024-11-14 | End: 2024-11-14 | Stop reason: SURG

## 2024-11-14 RX ORDER — CLONIDINE HYDROCHLORIDE 0.1 MG/1
0.1 TABLET ORAL
Status: DISCONTINUED | OUTPATIENT
Start: 2024-11-15 | End: 2024-11-18

## 2024-11-14 RX ORDER — MEMANTINE HYDROCHLORIDE 5 MG/1
5 TABLET ORAL DAILY
Status: DISCONTINUED | OUTPATIENT
Start: 2024-11-15 | End: 2024-11-18

## 2024-11-14 RX ORDER — KETOROLAC TROMETHAMINE 30 MG/ML
15 INJECTION, SOLUTION INTRAMUSCULAR; INTRAVENOUS EVERY 6 HOURS PRN
Status: DISCONTINUED | OUTPATIENT
Start: 2024-11-14 | End: 2024-11-15

## 2024-11-14 RX ORDER — ONDANSETRON 2 MG/ML
INJECTION INTRAMUSCULAR; INTRAVENOUS AS NEEDED
Status: DISCONTINUED | OUTPATIENT
Start: 2024-11-14 | End: 2024-11-14 | Stop reason: SURG

## 2024-11-14 RX ORDER — ACETAMINOPHEN 10 MG/ML
INJECTION, SOLUTION INTRAVENOUS AS NEEDED
Status: DISCONTINUED | OUTPATIENT
Start: 2024-11-14 | End: 2024-11-14 | Stop reason: SURG

## 2024-11-14 RX ORDER — METOCLOPRAMIDE HYDROCHLORIDE 5 MG/ML
10 INJECTION INTRAMUSCULAR; INTRAVENOUS EVERY 8 HOURS PRN
Status: DISCONTINUED | OUTPATIENT
Start: 2024-11-14 | End: 2024-11-14 | Stop reason: HOSPADM

## 2024-11-14 RX ORDER — ACETAMINOPHEN 500 MG
1000 TABLET ORAL ONCE AS NEEDED
Status: DISCONTINUED | OUTPATIENT
Start: 2024-11-14 | End: 2024-11-14 | Stop reason: HOSPADM

## 2024-11-14 RX ORDER — SODIUM PHOSPHATE, DIBASIC AND SODIUM PHOSPHATE, MONOBASIC 7; 19 G/230ML; G/230ML
1 ENEMA RECTAL ONCE AS NEEDED
Status: DISCONTINUED | OUTPATIENT
Start: 2024-11-14 | End: 2024-11-18

## 2024-11-14 RX ORDER — PANTOPRAZOLE SODIUM 20 MG/1
20 TABLET, DELAYED RELEASE ORAL
COMMUNITY

## 2024-11-14 RX ORDER — MIDAZOLAM HYDROCHLORIDE 1 MG/ML
INJECTION INTRAMUSCULAR; INTRAVENOUS AS NEEDED
Status: DISCONTINUED | OUTPATIENT
Start: 2024-11-14 | End: 2024-11-14 | Stop reason: SURG

## 2024-11-14 RX ORDER — DEXAMETHASONE SODIUM PHOSPHATE 4 MG/ML
VIAL (ML) INJECTION AS NEEDED
Status: DISCONTINUED | OUTPATIENT
Start: 2024-11-14 | End: 2024-11-14 | Stop reason: SURG

## 2024-11-14 RX ORDER — DOCUSATE SODIUM 100 MG/1
100 CAPSULE, LIQUID FILLED ORAL 2 TIMES DAILY
Status: DISCONTINUED | OUTPATIENT
Start: 2024-11-14 | End: 2024-11-18

## 2024-11-14 RX ORDER — MIDAZOLAM HYDROCHLORIDE 1 MG/ML
1 INJECTION INTRAMUSCULAR; INTRAVENOUS EVERY 5 MIN PRN
Status: DISCONTINUED | OUTPATIENT
Start: 2024-11-14 | End: 2024-11-14 | Stop reason: HOSPADM

## 2024-11-14 RX ORDER — LEVOTHYROXINE SODIUM 100 UG/1
100 TABLET ORAL
Status: DISCONTINUED | OUTPATIENT
Start: 2024-11-15 | End: 2024-11-18

## 2024-11-14 RX ORDER — DULOXETIN HYDROCHLORIDE 30 MG/1
30 CAPSULE, DELAYED RELEASE ORAL EVERY EVENING
COMMUNITY

## 2024-11-14 RX ORDER — DIPHENHYDRAMINE HYDROCHLORIDE 50 MG/ML
12.5 INJECTION INTRAMUSCULAR; INTRAVENOUS EVERY 4 HOURS PRN
Status: DISCONTINUED | OUTPATIENT
Start: 2024-11-14 | End: 2024-11-15

## 2024-11-14 RX ORDER — NALOXONE HYDROCHLORIDE 0.4 MG/ML
0.08 INJECTION, SOLUTION INTRAMUSCULAR; INTRAVENOUS; SUBCUTANEOUS AS NEEDED
Status: DISCONTINUED | OUTPATIENT
Start: 2024-11-14 | End: 2024-11-14 | Stop reason: HOSPADM

## 2024-11-14 RX ORDER — SODIUM CHLORIDE 9 MG/ML
INJECTION, SOLUTION INTRAVENOUS CONTINUOUS PRN
Status: DISCONTINUED | OUTPATIENT
Start: 2024-11-14 | End: 2024-11-14 | Stop reason: SURG

## 2024-11-14 RX ADMIN — ACETAMINOPHEN 1000 MG: 10 INJECTION, SOLUTION INTRAVENOUS at 11:27:00

## 2024-11-14 RX ADMIN — SODIUM CHLORIDE, SODIUM LACTATE, POTASSIUM CHLORIDE, CALCIUM CHLORIDE: 600; 310; 30; 20 INJECTION, SOLUTION INTRAVENOUS at 07:54:00

## 2024-11-14 RX ADMIN — ONDANSETRON 4 MG: 2 INJECTION INTRAMUSCULAR; INTRAVENOUS at 11:15:00

## 2024-11-14 RX ADMIN — SODIUM CHLORIDE, SODIUM LACTATE, POTASSIUM CHLORIDE, CALCIUM CHLORIDE: 600; 310; 30; 20 INJECTION, SOLUTION INTRAVENOUS at 12:09:00

## 2024-11-14 RX ADMIN — HYDRALAZINE HYDROCHLORIDE 10 MG: 20 INJECTION INTRAMUSCULAR; INTRAVENOUS at 11:27:00

## 2024-11-14 RX ADMIN — HYDRALAZINE HYDROCHLORIDE 5 MG: 20 INJECTION INTRAMUSCULAR; INTRAVENOUS at 11:42:00

## 2024-11-14 RX ADMIN — SODIUM CHLORIDE: 9 INJECTION, SOLUTION INTRAVENOUS at 12:09:00

## 2024-11-14 RX ADMIN — HYDRALAZINE HYDROCHLORIDE 5 MG: 20 INJECTION INTRAMUSCULAR; INTRAVENOUS at 08:56:00

## 2024-11-14 RX ADMIN — ACETAMINOPHEN: 10 INJECTION, SOLUTION INTRAVENOUS at 12:09:00

## 2024-11-14 RX ADMIN — DEXAMETHASONE SODIUM PHOSPHATE 10 MG: 4 MG/ML VIAL (ML) INJECTION at 08:51:00

## 2024-11-14 RX ADMIN — ROCURONIUM BROMIDE 40 MG: 10 INJECTION, SOLUTION INTRAVENOUS at 08:35:00

## 2024-11-14 RX ADMIN — TRANEXAMIC ACID 1000 MG: 10 INJECTION, SOLUTION INTRAVENOUS at 09:06:00

## 2024-11-14 RX ADMIN — SODIUM CHLORIDE: 9 INJECTION, SOLUTION INTRAVENOUS at 08:03:00

## 2024-11-14 RX ADMIN — TRANEXAMIC ACID: 10 INJECTION, SOLUTION INTRAVENOUS at 12:09:00

## 2024-11-14 RX ADMIN — DEXAMETHASONE SODIUM PHOSPHATE 10 MG: 4 MG/ML VIAL (ML) INJECTION at 11:08:00

## 2024-11-14 RX ADMIN — LABETALOL HYDROCHLORIDE 5 MG: 5 INJECTION, SOLUTION INTRAVENOUS at 11:51:00

## 2024-11-14 RX ADMIN — MIDAZOLAM HYDROCHLORIDE 2 MG: 1 INJECTION INTRAMUSCULAR; INTRAVENOUS at 07:51:00

## 2024-11-14 RX ADMIN — ROCURONIUM BROMIDE 10 MG: 10 INJECTION, SOLUTION INTRAVENOUS at 09:34:00

## 2024-11-14 NOTE — ANESTHESIA PREPROCEDURE EVALUATION
PRE-OP EVALUATION    Patient Name: Anjelica Bran    Admit Diagnosis: Cervical myelopathy (HCC) [G95.9]    Pre-op Diagnosis: Cervical myelopathy (HCC) [G95.9]    Posterior cervical 3  - cervical 4, cervical 4 - cervical 5, cervical 5 - cervical 6, cervical 6 - cervical 7 laminectomy and fusion from cervical 3- 4, cervical 4-5, cervical 5-6, cervical 6 -7, cervical 7- thoracic 1 and thoracic 1 - 2 with lateral mass and pedicle screws and bone graft.    Anesthesia Procedure: Posterior cervical 3  - cervical 4, cervical 4 - cervical 5, cervical 5 - cervical 6, cervical 6 - cervical 7 laminectomy and fusion from cervical 3- 4, cervical 4-5, cervical 5-6, cervical 6 -7, cervical 7- thoracic 1 and thoracic 1 - 2 with lateral mass and pedicle screws and bone graft. (Spine Cervical)  INTRAOPERATIVE NEURO MONITORING    Surgeons and Role:     * SULY Cheek, DO - Primary    Pre-op vitals reviewed.  Temp: 97.8 °F (36.6 °C)  Pulse: 73  Resp: 16  BP: 156/74  SpO2: 98 %  Body mass index is 33.3 kg/m².    Current medications reviewed.  Hospital Medications:   acetaminophen (Tylenol Extra Strength) tab 1,000 mg  1,000 mg Oral Once    lactated ringers infusion   Intravenous Continuous    ceFAZolin (Ancef) 2g in 10mL IV syringe premix  2 g Intravenous Once       Outpatient Medications:   Prescriptions Prior to Admission[1]    Allergies: Methotrexate      Anesthesia Evaluation    Patient summary reviewed.    Anesthetic Complications  (+) history of anesthetic complications  History of: PONV       GI/Hepatic/Renal                                 Cardiovascular                  (+) hypertension                                     Endo/Other      (+) diabetes                            Pulmonary               (+) shortness of breath            Neuro/Psych      (+) depression  (+) anxiety                              Past Surgical History:   Procedure Laterality Date    Hysterectomy      Lumbar spine surgery  2022     Social History      Socioeconomic History    Marital status:    Tobacco Use    Smoking status: Former     Types: Cigarettes    Smokeless tobacco: Never    Tobacco comments:     40 years ago    Vaping Use    Vaping status: Never Used   Substance and Sexual Activity    Alcohol use: Never    Drug use: Never     History   Drug Use Unknown     Available pre-op labs reviewed.               Airway      Mallampati: I  Mouth opening: >3 FB  TM distance: > 6 cm  Neck ROM: full Cardiovascular    Cardiovascular exam normal.  Rhythm: regular  Rate: normal     Dental             Pulmonary    Pulmonary exam normal.                 Other findings              ASA: 3   Plan: general  NPO status verified and           Plan/risks discussed with: patient and significant other                Present on Admission:  **None**             [1]   Medications Prior to Admission   Medication Sig Dispense Refill Last Dose/Taking    montelukast 10 MG Oral Tab Take 1 tablet (10 mg total) by mouth nightly.   11/13/2024    donepezil 10 MG Oral Tab Take 1 tablet (10 mg total) by mouth nightly.   11/13/2024    levothyroxine 100 MCG Oral Tab Take 1 tablet (100 mcg total) by mouth before breakfast.   11/14/2024 Morning    amLODIPine 5 MG Oral Tab Take 1 tablet (5 mg total) by mouth daily.   11/13/2024    buPROPion  MG Oral Tablet 24 Hr Take 1 tablet (300 mg total) by mouth daily. 450 mg daily   11/14/2024 Morning    buPROPion  MG Oral Tablet 24 Hr Take 1 tablet (150 mg total) by mouth daily.   11/14/2024 Morning    cloNIDine 0.1 MG Oral Tab Take 1 tablet (0.1 mg total) by mouth 4 (four) times a week. Sunday, Monday, wed, and friday 11/13/2024    enalapril 20 MG Oral Tab Take 1 tablet (20 mg total) by mouth 2 (two) times daily.   11/13/2024    fenofibrate 145 MG Oral Tab Take 1 tablet (145 mg total) by mouth daily.   11/14/2024 Morning    Potassium Chloride ER 10 MEQ Oral Tab CR Take 2 tablets (20 mEq total) by mouth 2 (two) times daily.    11/14/2024 Morning    DULoxetine 60 MG Oral Cap DR Particles Take 1 capsule (60 mg total) by mouth daily. 30 mg in evening   11/14/2024 Morning    folic acid 1 MG Oral Tab Take by mouth daily.   11/14/2024 Morning    ferrous sulfate 325 (65 FE) MG Oral Tab EC Take 1 tablet (325 mg total) by mouth daily with breakfast.   11/14/2024 Morning    memantine 5 MG Oral Tab Take 1 tablet (5 mg total) by mouth daily.   11/14/2024 Morning    labetalol 100 MG Oral Tab Take 1 tablet (100 mg total) by mouth daily.   11/14/2024 at  3:00 AM    Cholecalciferol 125 MCG (5000 UT) Oral Tab Take 1 tablet (5,000 Units total) by mouth daily.   11/13/2024    Biotin 1000 MCG Oral Tab Take 1,000 mcg by mouth Noon.   11/13/2024    cyclobenzaprine 10 MG Oral Tab 1 tablet at bedtime as needed Orally Once a day       furosemide 20 MG Oral Tab 1 tab(s) orally once a day       furosemide 40 MG Oral Tab Take 1 tablet (40 mg total) by mouth daily.       ibuprofen 600 MG Oral Tab TK 1 T PO TID Oral for 90 (Patient not taking: Reported on 11/6/2024)       metFORMIN 500 MG Oral Tab TK 1 T PO TID WC Oral for 90 (Patient not taking: Reported on 11/6/2024)       omeprazole 20 MG Oral Capsule Delayed Release 1 capsule 30 minutes before morning meal Orally Once a day       hydroxychloroquine 200 MG Oral Tab Take 1 tablet (200 mg total) by mouth 2 (two) times daily. (Patient not taking: Reported on 11/6/2024)

## 2024-11-14 NOTE — BRIEF OP NOTE
Pre-Operative Diagnosis: Cervical myelopathy (HCC) [G95.9]     Post-Operative Diagnosis: Cervical myelopathy (HCC) [G95.9]      Procedure Performed:   Posterior cervical 3  - cervical 4, cervical 4 - cervical 5, cervical 5 - cervical 6, cervical 6 - cervical 7 laminectomy and fusion from cervical 3- 4, cervical 4-5, cervical 5-6, cervical 6 -7, cervical 7- thoracic 1 and thoracic 1 - 2 with lateral mass and pedicle screws and bone graft.    Surgeons and Role:     * SULY Cheek DO - Primary    Assistant(s):  Surgical Assistant.: Eusebio Cervantes, MLIAGROS     Surgical Findings: severe degen disease of C spine     Specimen: none     Estimated Blood Loss: Blood Output: 50 mL (11/14/2024 11:29 AM)      Dictation Number:  to follow    Wei Cheek DO  11/14/2024  12:06 PM

## 2024-11-14 NOTE — PROGRESS NOTES
NURSING ADMISSION NOTE      Patient admitted via bed from PACU post 4 level Posterior Lami and fusion.  Received drowsy but easily awaken when name is called.  Both upper arms is weak on grasp and weak to lift it up.  Oriented to room.  Post surgical orders discussed with patient and her family, reinstructed on use of PCA .  Patinet on Oxygen at 3L/nc, banks catheter in p[lace with clear, yellow urine.  Safety precautions initiated.  Dysphagia screening done and she passed. Bed in low position. Call light in reach.  Instructed to use call light for assistance.  [facility] Hospitalist for medical consult, Dr. Gabriela Wylie notified through perfect serve.

## 2024-11-14 NOTE — OPERATIVE REPORT
Date of surgery 11/14/24     Preoperative dx cervical myeloradiculopathy     Postoperative dx same     Procedure [please list them in numbered format]   1. Posterior midline approach to cervical spine from C3-T2   2. Decompression with (bilateral/side) laminectomies at levels C3-7, medial facetectomies at levels C3-7, and foraminotomies at levels C3-7 as well as R C7-T1   3. Use of microscope for decompression   4. Posterior fusion C3-T2   5. Use of local laminectomy bone +/- DBM for posterolateral fusion C3-T2   6. Segmental posterior instrumentation from C3-T2 with Seaspine screws and rods   7. Intraoperative SSEP and MEP monitoring   8. Use of radiographs for vertebral level localization, and for confirmation of appropriate hardware placement     Surgeon[s]  Ham Cervantes SA     Anesthesia GETA     IVF 1400    EBL 50    Uout  250    Specimen none     Drains medium Hemovac    Complications  none    Condition stable to PACU     Indications     This is a 73 YO M/F with intractable neck pain and UE numbness/tingling having failed [all nonoperative management]. It was mutually decided that surgical intervention was the best option at this point. All risks and benefits of the surgery were explained to the patient, and he/she wishes to proceed with the surgery.     Details of surgery     Patient was taken to the OR, and while on the gurney [hospital bed] was placed under general anesthesia. Godwin prongs were carefully placed around the patient's cranium under sterile conditions. S[He] was then carefully turned prone onto a OR table with a Godwin cervical frame attachment, and pins fastened to 60 lbs with the head and neck in appropriate position.  All bony prominences were well-padded. 6 inch cloth tape was used to provide some traction to the shoulders. The neck was then prepped and draped in standard fashion. A 10-blade scalpel was used to make a vertical longitudinal incision approximately  spanning spinous processes of C3-T2. [After the skin was scored, epinephrine-containing lidocaine was used to inject the subcutaneous tissues to minimize bleeding]. Bovie cautery was then used to deepen the incision thru the subcutaneous tissues and fascia in line with the incision, down to the spinous processes. A Kocher clamp was applied to a spinous process and a lateral radiograph taken to confirm the vertebral level.   With the level radiographically confirmed, the posterior elements of the vertebral bodies from the inferior half of C3 to the superior half of T2 were exposed in subperiosteal fashion. The bovie cautery, jake, curettes, and rongeurs were used to skeletonize the posterior elements from lateral mass to lateral mass bilaterally   Cerebellar retractors were placed for self-retaining exposure. The decompression was then begun.The spinous processes were first removed using a rongeur. Laminectomy was performed with Kerrison rongeurs of various sizes, along with bilateral medial facetectomies and foraminotomies.  By the end of the decompression procedure, the central canal, lateral recesses, and foramina were seen and palpated to be widely patent.   We then placed our lateral mass screws at C3, C4, C5, and C6 and pedicle screws at T1 and T2 bilaterally, as described below.The high speed danilo was used to start the hole, then a pedicle probe was used to find the pedicle. The hole was tapped and an appropriate length pedicle screw was placed. Pedicle screw placement was done similarly in each pedicle. Lateral mass screws were placed identically first with a danilo to begin the hole, then utilizing a handheld drill to  our screw trajectory and finally placing the appropriate size screw.  Next the fusion bed was prepared. The posterolateral elements were decorticated using the high speed danilo and curettes. Then the entire wound was thoroughly irrigated with antibiotic-containing irrigant. All bleeding was  controlled using bipolar cautery, thrombin-soaked gelfoam, or floseal.    Next, gelfoam was laid down over the canal to prevent bone graft from falling in. Bone graft [from local laminectomy bone and/or DBM] was then placed along the lateral pillars bilaterally, spanning the exposed and decorticated posterolateral elements.  The screws were then connected using appropriately bent rods of adequate length. Final tightening of set screws were performed using the appropriate instruments. A lateral radiograph was obtained to confirm appropriate placement and lengths of screws.   Closure was then commenced. A drain was placed underneath the fascia. The fascia was closed using #1 Vicryl in interrupted fashion. The subcutaneous tissue was closed using #1 Vicryl as well. The dermis was closed using 2-0 Vicryl in interrupted fashion. The skin was closed with a subcuticular 3-0 Biosyn running suture. Mastisol and steri-strips were applied. Xeroform was then applied, followed by 4x4 gauze and micropore tape to secure the dressing and drain. All needle and sponge counts were correct. There were no complications during this surgery.  A hard cervical collar was placed. The Godwin head-holding frame was dismantled. The patient was then carefully rolled supine onto a hospital bed. The Godwin tongs were then carefully removed. The patient was revived, extubated, and taken to recovery room in stable condition.

## 2024-11-14 NOTE — ANESTHESIA PROCEDURE NOTES
Airway  Date/Time: 11/14/2024 7:54 AM  Urgency: elective    Airway not difficult    General Information and Staff    Patient location during procedure: OR  Anesthesiologist: Dmitry Arnold MD  Performed: anesthesiologist   Performed by: Dmitry Arnold MD  Authorized by: Dmitry Arnold MD      Indications and Patient Condition  Indications for airway management: anesthesia  Sedation level: deep  Preoxygenated: yes  Patient position: sniffing  Mask difficulty assessment: 1 - vent by mask  Planned trial extubation    Final Airway Details  Final airway type: endotracheal airway      Successful airway: ETT  Cuffed: yes   Successful intubation technique: Video laryngoscopy  Endotracheal tube insertion site: oral  Blade: GlideScope  Blade size: #3  ETT size (mm): 7.0    Cormack-Lehane Classification: grade IIA - partial view of glottis  Placement verified by: capnometry   Measured from: lips  ETT to lips (cm): 20  Number of attempts at approach: 1

## 2024-11-14 NOTE — ANESTHESIA PROCEDURE NOTES
Arterial Line    Date/Time: 11/14/2024 8:02 AM    Performed by: Dmitry Arnold MD  Authorized by: Dmitry Arnold MD    General Information and Staff    Procedure Start:  11/14/2024 8:02 AM  Procedure End:  11/14/2024 8:06 AM  Anesthesiologist:  Dmitry Arnold MD  Performed By:  Anesthesiologist  Patient Location:  OR  Indication: continuous blood pressure monitoring and blood sampling needed    Site Identification: real time ultrasound guided and surface landmarks    Preanesthetic Checklist: 2 patient identifiers, IV checked, risks and benefits discussed, monitors and equipment checked, pre-op evaluation, timeout performed, anesthesia consent and sterile technique used    Procedure Details    Catheter Size:  20 G  Catheter Length:  1 and 3/4 inch  Catheter Type:  Arrow  Seldinger Technique?: Yes    Laterality:  Left  Site:  Radial artery  Site Prep: chlorhexidine    Line Secured:  Wrist Brace, tape and Tegaderm    Assessment    Events: patient tolerated procedure well with no complications      Medications  11/14/2024 8:02 AM      Additional Comments

## 2024-11-14 NOTE — SPIRITUAL CARE NOTE
Spiritual Care Visit Note    Patient Name: Anjelica Bran Date of Spiritual Care Visit: 24   : 3/20/1950 Primary Dx: <principal problem not specified>       Referred By:      Spiritual Care Taxonomy:    Intended Effects: Aligning care plan with patient's values         Interventions: Assist someone with Advance Directives    Visit Type/Summary:     - PoA: New PoAH Created: Visited patient in response to PoA for Healthcare consult/request. Created a new PoAH for Healthcare document that, per the patient, accurately reflects their wishes. Gave the patient the original PoAH document along with copies. Confirmed that the PoAH primary agent name and contact information have been entered into the Epic, Advance Directives section. A copy of the new PoAH document has been placed on the patient's paper chart. Anjelica was in a positive mood about outcomes today, and thanked  for providing a POAH.  wished her well.   remains available for follow up.    Spiritual Care support can be requested via an Epic consult. For urgent/immediate needs, please contact the On Call  at: [place]: ext [phone]

## 2024-11-14 NOTE — ANESTHESIA POSTPROCEDURE EVALUATION
[facility]    Anjelica Bran Patient Status:  Inpatient   Age/Gender 74 year old female MRN [id number]   Location [facility] POST ANESTHESIA CARE UNIT Attending SULY Cheek DO   Hosp Day # 0 PCP ASHLEY CHEEK       Anesthesia Post-op Note    Posterior cervical 3  - cervical 4, cervical 4 - cervical 5, cervical 5 - cervical 6, cervical 6 - cervical 7 laminectomy and fusion from cervical 3- 4, cervical 4-5, cervical 5-6, cervical 6 -7, cervical 7- thoracic 1 and thoracic 1 - 2 with lateral mass and pedicle screws and bone graft.    Procedure Summary       Date: 11/14/24 Room / Location:  MAIN OR  /  MAIN OR    Anesthesia Start: 0750 Anesthesia Stop: 1211    Procedures:       Posterior cervical 3  - cervical 4, cervical 4 - cervical 5, cervical 5 - cervical 6, cervical 6 - cervical 7 laminectomy and fusion from cervical 3- 4, cervical 4-5, cervical 5-6, cervical 6 -7, cervical 7- thoracic 1 and thoracic 1 - 2 with lateral mass and pedicle screws and bone graft. (Spine Cervical)      INTRAOPERATIVE NEURO MONITORING (Back) Diagnosis:       Cervical myelopathy (HCC)      (Cervical myelopathy (HCC) [G95.9])    Surgeons: SULY Cheek DO Anesthesiologist: Dmitry Arnold MD    Anesthesia Type: general ASA Status: 3            Anesthesia Type: general    Vitals Value Taken Time   /49 11/14/24 1211   Temp 97.2 °F (36.2 °C) 11/14/24 1211   Pulse 74 11/14/24 1211   Resp 16 11/14/24 1211   SpO2 97 % 11/14/24 1211       Patient Location: PACU    Anesthesia Type: general    Airway Patency: patent    Postop Pain Control: adequate    Mental Status: mildly sedated but able to meaningfully participate in the post-anesthesia evaluation    Nausea/Vomiting: none    Cardiopulmonary/Hydration status: stable euvolemic    Complications: no apparent anesthesia related complications    Postop vital signs: stable    Dental Exam: Unchanged from Preop    Patient to be discharged from PACU when criteria met.

## 2024-11-14 NOTE — CONSULTS
[place] [facility]PITAL[facility]  CONSULT     Anjelica Bran Patient Status:  Inpatient    3/20/1950 MRN [id number]   Location [facility] 3SW-A Attending SULY Cheek, DO   Hosp Day # 0 PCP ASHLEY CHEEK     Reason for consult: Medical management     Requested by: Neurosurgery     Subjective:   History of Present Illness:     Anjelica Bran is a 74 year old female with past medical history of Hypertension, hyperlipidemia, DM, CKD stage IIIb, anxiety who presents to [facility] for management of cervical myelopathy.  Patient underwent cervical laminectomy and fusion by Dr. Cheek today.    History/Other:    Past Medical History:  Past Medical History:    Anesthesia complication    post op confusion, prolonged wake time    Anxiety state    Cervical myelopathy (HCC)    Chronic kidney disease, stage 3b (HCC)    Depression    Diabetes (HCC)    Essential hypertension    High blood pressure    High cholesterol    Renal disorder    Shortness of breath    excertional    Visual impairment    glasses     Past Surgical History:   Past Surgical History:   Procedure Laterality Date    Hysterectomy      Lumbar spine surgery        Family History:   History reviewed. No pertinent family history.  Social History:    reports that she has quit smoking. Her smoking use included cigarettes. She has never used smokeless tobacco. She reports that she does not drink alcohol and does not use drugs.     Allergies: Allergies[1]    Medications:  Medications Ordered Prior to Encounter[2]    Review of Systems:   A comprehensive review of systems was completed.    Pertinent positives and negatives noted in the HPI.    Objective:   Physical Exam:    BP (!) 168/74 (BP Location: Right arm)   Pulse 88   Temp 97.8 °F (36.6 °C) (Oral)   Resp 22   Ht 5' 3\" (1.6 m)   Wt 188 lb (85.3 kg)   SpO2 95%   BMI 33.30 kg/m²   General: No acute distress, Alert  Respiratory: No rhonchi, no wheezes  Cardiovascular: S1, S2. Regular rate and rhythm  Abdomen:  Soft, NT/ND, +BS  Neuro: No new focal deficits  Extremities: No edema    Results:    Labs:      Labs Last 24 Hours:  Recent Labs   Lab 11/14/24  0645   WBC 7.7   HGB 10.9*   MCV 96.6   .0       No results for input(s): \"GLU\", \"BUN\", \"CREATSERUM\", \"GFRAA\", \"GFRNAA\", \"CA\", \"ALB\", \"NA\", \"K\", \"CL\", \"CO2\", \"ALKPHO\", \"AST\", \"ALT\", \"BILT\", \"TP\" in the last 168 hours.    No results for input(s): \"PTP\", \"INR\" in the last 168 hours.    No results for input(s): \"TROP\", \"CK\" in the last 168 hours.      Imaging: Imaging data reviewed in Epic.    Assessment & Plan:      #Cervical myelopathy  -S/p cervical laminectomy and fusion on 11/14  -Management per neurosurgery    #Hypertension  -Hydrochlorothiazide, amlodipine, clonidine, enalapril, labetalol    #Hyperlipidemia  -Fenofibrate    #Diabetes  -Not on meds at home     #Dementia  -Donepezil, memantine    #Hypothyroidism  -Levothyroxine    #CKD stage IIIb    #Anxiety  -Duloxetine, buspirone        Plan of care discussed with patient     Gabriela WylieDO  11/14/2024    The 21st Century Cures Act makes medical notes like these available to patients in the interest of transparency. Please be advised this is a medical document. Medical documents are intended to carry relevant information, facts as evident, and the clinical opinion of the practitioner. The medical note is intended as peer to peer communication and may appear blunt or direct. It is written in medical language and may contain abbreviations or verbiage that are unfamiliar.             [1]   Allergies  Allergen Reactions    Methotrexate OTHER (SEE COMMENTS)     Swelling   [2]   No current facility-administered medications on file prior to encounter.     Current Outpatient Medications on File Prior to Encounter   Medication Sig Dispense Refill    DULoxetine 30 MG Oral Cap DR Particles Take 1 capsule (30 mg total) by mouth every evening.      hydroCHLOROthiazide 25 MG Oral Tab Take 1 tablet (25 mg total) by mouth  daily.      pantoprazole 20 MG Oral Tab EC Take 1 tablet (20 mg total) by mouth every morning before breakfast.      montelukast 10 MG Oral Tab Take 1 tablet (10 mg total) by mouth nightly.      donepezil 10 MG Oral Tab Take 1 tablet (10 mg total) by mouth nightly.      levothyroxine 100 MCG Oral Tab Take 1 tablet (100 mcg total) by mouth before breakfast.      amLODIPine 5 MG Oral Tab Take 1 tablet (5 mg total) by mouth daily.      buPROPion  MG Oral Tablet 24 Hr Take 1 tablet (300 mg total) by mouth daily. 450 mg daily      buPROPion  MG Oral Tablet 24 Hr Take 1 tablet (150 mg total) by mouth daily.      cloNIDine 0.1 MG Oral Tab Take 1 tablet (0.1 mg total) by mouth 4 (four) times a week. Sunday, Monday, wed, and friday      enalapril 20 MG Oral Tab Take 1 tablet (20 mg total) by mouth 2 (two) times daily.      fenofibrate 145 MG Oral Tab Take 1 tablet (145 mg total) by mouth daily.      Potassium Chloride ER 10 MEQ Oral Tab CR Take 1 tablet (10 mEq total) by mouth daily.      DULoxetine 60 MG Oral Cap DR Particles Take 1 capsule (60 mg total) by mouth daily.      folic acid 1 MG Oral Tab Take 1 tablet (1 mg total) by mouth daily.      ferrous sulfate 325 (65 FE) MG Oral Tab EC Take 1 tablet (325 mg total) by mouth daily with breakfast.      memantine 5 MG Oral Tab Take 1 tablet (5 mg total) by mouth daily.      labetalol 100 MG Oral Tab Take 1 tablet (100 mg total) by mouth every morning.      Cholecalciferol 125 MCG (5000 UT) Oral Tab Take 1 tablet (5,000 Units total) by mouth daily.      Biotin 1000 MCG Oral Tab Take 1,000 mcg by mouth Noon.

## 2024-11-15 ENCOUNTER — APPOINTMENT (OUTPATIENT)
Dept: GENERAL RADIOLOGY | Facility: HOSPITAL | Age: 74
End: 2024-11-15
Attending: NEUROLOGICAL SURGERY
Payer: MEDICARE

## 2024-11-15 LAB
ANION GAP SERPL CALC-SCNC: 8 MMOL/L (ref 0–18)
BUN BLD-MCNC: 19 MG/DL (ref 9–23)
CALCIUM BLD-MCNC: 8.9 MG/DL (ref 8.7–10.4)
CHLORIDE SERPL-SCNC: 109 MMOL/L (ref 98–112)
CO2 SERPL-SCNC: 20 MMOL/L (ref 21–32)
CREAT BLD-MCNC: 1.43 MG/DL
EGFRCR SERPLBLD CKD-EPI 2021: 39 ML/MIN/1.73M2 (ref 60–?)
GLUCOSE BLD-MCNC: 151 MG/DL (ref 70–99)
HCT VFR BLD AUTO: 30.9 %
HGB BLD-MCNC: 10.2 G/DL
OSMOLALITY SERPL CALC.SUM OF ELEC: 289 MOSM/KG (ref 275–295)
POTASSIUM SERPL-SCNC: 4.1 MMOL/L (ref 3.5–5.1)
SODIUM SERPL-SCNC: 137 MMOL/L (ref 136–145)

## 2024-11-15 PROCEDURE — 99232 SBSQ HOSP IP/OBS MODERATE 35: CPT | Performed by: STUDENT IN AN ORGANIZED HEALTH CARE EDUCATION/TRAINING PROGRAM

## 2024-11-15 PROCEDURE — 72040 X-RAY EXAM NECK SPINE 2-3 VW: CPT | Performed by: NEUROLOGICAL SURGERY

## 2024-11-15 RX ORDER — HYDROCODONE BITARTRATE AND ACETAMINOPHEN 5; 325 MG/1; MG/1
1 TABLET ORAL EVERY 4 HOURS PRN
Status: DISCONTINUED | OUTPATIENT
Start: 2024-11-15 | End: 2024-11-18

## 2024-11-15 RX ORDER — HYDROCODONE BITARTRATE AND ACETAMINOPHEN 5; 325 MG/1; MG/1
1-2 TABLET ORAL EVERY 4 HOURS PRN
Qty: 50 TABLET | Refills: 0 | Status: SHIPPED | OUTPATIENT
Start: 2024-11-15

## 2024-11-15 RX ORDER — LABETALOL HYDROCHLORIDE 5 MG/ML
10 INJECTION, SOLUTION INTRAVENOUS ONCE
Status: COMPLETED | OUTPATIENT
Start: 2024-11-15 | End: 2024-11-15

## 2024-11-15 RX ORDER — DIAZEPAM 5 MG/1
5 TABLET ORAL EVERY 8 HOURS PRN
Qty: 30 TABLET | Refills: 0 | Status: SHIPPED | OUTPATIENT
Start: 2024-11-15

## 2024-11-15 RX ORDER — HYDROCODONE BITARTRATE AND ACETAMINOPHEN 5; 325 MG/1; MG/1
2 TABLET ORAL EVERY 4 HOURS PRN
Status: DISCONTINUED | OUTPATIENT
Start: 2024-11-15 | End: 2024-11-18

## 2024-11-15 RX ORDER — PSEUDOEPHEDRINE HCL 30 MG
100 TABLET ORAL 2 TIMES DAILY
Qty: 30 CAPSULE | Refills: 0 | Status: SHIPPED | OUTPATIENT
Start: 2024-11-15

## 2024-11-15 NOTE — PHYSICAL THERAPY NOTE
PHYSICAL THERAPY EVALUATION - INPATIENT     Room Number: 353/353-A  Evaluation Date: 11/15/2024  Type of Evaluation: Initial  Physician Order: PT Eval and Treat    Presenting Problem: posterior C3-C7 laminectomy and fusion with C3-T2 with lateral mass and pedicle screws and graft  Co-Morbidities : htn, dementia  Reason for Therapy: Mobility Dysfunction and Discharge Planning    Procedure Performed 11/14/24 Dr Cheek:   Posterior cervical 3  - cervical 4, cervical 4 - cervical 5, cervical 5 - cervical 6, cervical 6 - cervical 7 laminectomy and fusion from cervical 3- 4, cervical 4-5, cervical 5-6, cervical 6 -7, cervical 7- thoracic 1 and thoracic 1 - 2 with lateral mass and pedicle screws and bone graft.    PHYSICAL THERAPY ASSESSMENT   Patient is a 74 year old female admitted 11/14/2024 for planned cervical spine surgery.  Prior to admission, patient's baseline is [place] with RW or cane or electric scooter.  Patient is currently functioning near baseline with bed mobility, transfers, gait, stair negotiation, maintaining seated position, and standing prolonged periods.  Patient is requiring stand-by assist and contact guard assist as a result of the following impairments: decreased functional strength, decreased endurance/aerobic capacity, pain, impaired dynamic standing balance, impaired coordination, impaired motor planning, decreased muscular endurance, difficulty maintaining precautions, and cognitive deficits (incr confusion ).  Physical Therapy will continue to follow for duration of hospitalization.    Patient will benefit from continued skilled PT Services at discharge to promote prior level of function and safety with additional support and return home with home health PT.    PLAN DURING HOSPITALIZATION  Nursing Mobility Recommendation : 1 Assist  PT Device Recommendation: Rolling walker  PT Treatment Plan: Bed mobility;Body mechanics;Coordination;Don/doff brace;Endurance;Energy conservation;Patient  education;Family education;Gait training;Neuromuscular re-educate;Range of motion;Strengthening;Stoop training;Stair training;Transfer training;Balance training  Rehab Potential : Good  Frequency (Obs): Daily     CURRENT GOALS  Goal #1  Patient is able to demonstrate supine - sit EOB @ level: Eben     Goal #2  Patient is able to demonstrate transfers Sit to/from Stand at assistance level: Eben   Goal #3    Patient is able to ambulate 150 feet with assistive device at assistance level: supervision   Goal #4    Patient will negotiate 4 stairs/one curb w/ assistive device and supervision   Goal #5    Patient verbalizes and/or demonstrates all precautions and safety concerns independently    Goal #6      Goal Comments: Goals established on 11/15/2024    PHYSICAL THERAPY MEDICAL/SOCIAL HISTORY  History related to current admission: Patient is a 74 year old female admitted on 2024 from home for planned cervical spine surgery.    HOME SITUATION  Type of Home: House   Home Layout: One level;Ramped entrance              Lives With: Other (Comment) (grandson)  Drives: No  Patient Regularly Uses: Glasses;Cane;Rolling walker (electric scooter)    Prior Level of [place]:   Per pt lives in one story home with ramped entrance- 22 year old grandson lives with her (but per son he is not much help to her). Pt's three sons all live within a quarter mile block of each other ([place] farm). Pt reports she fluctuates between using electric scooter, RW, or cane. Uses RW when out in community. No hx of falls. No longer drives. Family provides transportation to/from appts, etc. Pt reports her daughter-in-laws will be helpful at time of dc.     SUBJECTIVE  \"I had my surgery a few days ago.\" - (surgery was yesterday)    OBJECTIVE  Precautions: Bed/chair alarm  Fall Risk: High fall risk    WEIGHT BEARING RESTRICTION     PAIN ASSESSMENT  Ratin  Location: incisional  Management Techniques: Activity promotion;Body  mechanics;Repositioning    COGNITION  Overall Cognitive Status:  WFL - within functional limits    RANGE OF MOTION AND STRENGTH ASSESSMENT  Upper extremity ROM and strength are within functional limits   Lower extremity ROM is within functional limits   Lower extremity strength is within functional limits     BALANCE  Static Sitting: Good  Dynamic Sitting: Fair +  Static Standing: Fair  Dynamic Standing: Fair -    ADDITIONAL TESTS                                    ACTIVITY TOLERANCE                         O2 WALK       NEUROLOGICAL FINDINGS                        AM-PAC '6-Clicks' INPATIENT SHORT FORM - BASIC MOBILITY  How much difficulty does the patient currently have...  Patient Difficulty: Turning over in bed (including adjusting bedclothes, sheets and blankets)?: A Little   Patient Difficulty: Sitting down on and standing up from a chair with arms (e.g., wheelchair, bedside commode, etc.): A Little   Patient Difficulty: Moving from lying on back to sitting on the side of the bed?: A Little   How much help from another person does the patient currently need...   Help from Another: Moving to and from a bed to a chair (including a wheelchair)?: A Little   Help from Another: Need to walk in hospital room?: A Little   Help from Another: Climbing 3-5 steps with a railing?: A Little       AM-PAC Score:  Raw Score: 18   Approx Degree of Impairment: 46.58%   Standardized Score (AM-PAC Scale): 43.63   CMS Modifier (G-Code): CK    FUNCTIONAL ABILITY STATUS  Gait Assessment   Functional Mobility/Gait Assessment  Gait Assistance: Contact guard assist;Supervision  Distance (ft): 150  Assistive Device: Rolling walker  Pattern: Within Functional Limits    Skilled Therapy Provided  Educated on spine precautions: no bending, lifting, twisting, limiting overhead movements  Educated on use of Aspen collar   Educated on importance of continued out of bed mobility and ambulation with use of RW  Encouraged use of ice for pain and  swelling management  Educated on use of RW at all times for balance and safety    Bed mobility via log roll R> sidelying to sitting EOB> while sitting EOB donned socks> sit to stand to RW> ambulated to bathroom with RW> ambulated 150 feet with RW> upright in chair at end of session     Bed Mobility:  Rolling: educated on log roll - able to complete at supervision with incr time  Right Sidelying to sitting EOB: supervision   Sit to supine: NT     Transfer Mobility:  Sit to stand: supervision to RW-  for hand placement   Stand to sit: supervision  Gait = CGA progressing to supervision with RW X 150 feet- minor shuffle, did not observe any major loss of balances     Therapist's Comments:   Patient presents sitting up in bed. Son (Nura) present bedside. Discussed role and goal of physical therapy in hospital setting. Pt in agreement to session.     Exercise/Education Provided:  Bed mobility  Body mechanics  Energy conservation  Functional activity tolerated  Gait training  Posture  Transfer training    Patient End of Session: Up in chair;Needs met;Call light within reach;RN aware of session/findings;All patient questions and concerns addressed;Hospital anti-slip socks;Alarm set;SCDs in place;Discussed recommendations with /    Patient Evaluation Complexity Level:  History Moderate - 1 or 2 personal factors and/or co-morbidities   Examination of body systems Low -  addressing 1-2 elements   Clinical Presentation Low- Stable   Clinical Decision Making Low Complexity     PT Session Time: 30 minutes  Gait Training: 15 minutes

## 2024-11-15 NOTE — PROGRESS NOTES
[facility]  Neurosurgery Progress Note    Anjelica Bran Patient Status:  Inpatient    3/20/1950 MRN [id number]   Location [facility] 3SW-A Attending SULY Cheek, DO   Hosp Day # 1 PCP ASHLEY CHEEK     PRINCIPLE PROBLEM:   S/p C3-T2 fusion POD #1    SUBJECTIVE     Pt seen and examined, reports expected post-op pain to posterior shoulders and back of neck. Experienced a \"burning\" sensation to the posterior neck earlier this AM, now resolved. Reports resolution of RUE pain, which she is pleased with. She has no new complaints otherwise.       OBJECTIVE/PHYSICAL EXAM     VITALS:  /61 (BP Location: Right arm)   Pulse 88   Temp 98.5 °F (36.9 °C) (Oral)   Resp 16   Ht 63\"   Wt 188 lb (85.3 kg)   SpO2 91%   BMI 33.30 kg/m²     GENERAL: No acute distress, non-toxic appearing, mood appropriate    HEENT: Normocephalic, atraumatic    RESP:  Respirations non-labored, even    CV:  NSR on tele    NEURO: Alert and oriented x3.  Able to follow commands.  Comprehension intact.  Speech clear, fluent. Sensation to light touch is intact bilaterally. Strength 5/5 in all extremities. CAZARES x 4. Gait deferred.     SKIN: Surgical dressing C/D/I    DRAINS: Hemovac drain intact with 150 mL sanguinous output in 24 hours.    LABS     Lab Results   Component Value Date    HGB 10.2 11/15/2024    HCT 30.9 11/15/2024    CREATSERUM 1.43 11/15/2024    BUN 19 11/15/2024     11/15/2024    K 4.1 11/15/2024     11/15/2024    CO2 20.0 11/15/2024     11/15/2024    CA 8.9 11/15/2024    PGLU 147 2024     IMAGING     XR FLUOROSCOPY C-ARM TIME LESS THAN 1 HOUR (CPT=76000)    Result Date: 2024  CONCLUSION:  Intraoperative fluoroscopic guidance during cervical fusion.   LOCATION:  [place]    Dictated by (CST): Dejan Muñoz MD on 2024 at 11:51 AM     Finalized by (CST): Dejan Muñoz MD on 2024 at 11:51 AM        ASSESSMENT & PLAN     ASSESSMENT:  S/p C3-T2 fusion POD #1    PLAN:  DC PCA,  transition to PO pain medications  DC banks catheter  Continue drain   Aspen collar when OOB  Post-op XR C spine pending  Encourage use of IS  PT/OT  Medical management per hospitalist    The above plan was discussed with Dr. Cheek.    REAL Adamson-NP  [facility]  11/15/2024, 8:21 AM

## 2024-11-15 NOTE — DISCHARGE INSTRUCTIONS
Cervical Spine Surgery: Discharge Instructions     Activity Restrictions  No twisting, pulling, pushing or lifting > 10 lbs (a gallon of milk is approximately 8 lbs)  No lifting anything over your head.  Turn your body as a unit, especially if you use a hard collar. Don’t turn your head suddenly or extremely to look from side to side.     Sitting  Sit with your knees at about the same level as your hips; use a footstool if needed.  Firm chairs with straight backs give better support’ recliners are OK to use.  Change position every 20-30 minutes when sitting (example: after sitting, stand, then you can sit again for another 20-30 minutes).    Walking  Listen to your body and walk as much as you tolerate.  Walk several times a day.  Smaller distances are better.  Your goal is to increase the distance you walk each day.  Remember to listen to your body.    Swallowing  It is not uncommon to experience throat soreness or mild difficulty swallowing after surgery. This is due to swelling of the muscles and tissues in the area. This will improve within 1-2 weeks after surgery.  Taking small bites of food followed by small sips of liquids can help.  You may drink hot or cold liquids, to your preference.  If you experience any choking, coughing, or difficulty getting food or liquids down, please notify your surgeon's office immediately.     Stairs  Climb as needed.     Sex  After two weeks and only when comfortable.  Stop if causing pain.  Check with surgeon for more information.    Sleeping  Log roll to turn.  Support your neck with one pillow keeping it in a neutral position  Sleep on back or side.  Avoid sleeping on stomach.    Driving  Check with physician at office visit when you may resume driving.  Do not drive while taking narcotics or muscle relaxants  No driving while using or needing a cervical collar.  Adhere to sitting restrictions.  You may be a passenger.  Wear your seat belt.    Smoking  Smoking is prohibited  after surgery, as smoking will inhibit the spine from healing.  Even one cigarette a day will cause problems.  Chewing tobacco, nicotine gum or patches will also inhibit bone healing.    Physical Therapy  You will be cleared to start PT at your 1-month visit.    Return to work  To be discussed with your surgeon.      Neck Brace (If Applicable)    Hard cervical collar  Wear as instructed  May remove when showering. Place collar back on after showering.  Wear even when sleeping IF instructed to do so.  Exact time (weeks) collar to be worn to be determined by surgeon. Discuss at office visit.  Keep neck straight while removing and replacing collar (No bending, turning or flexing neck with collar off)     Soft cervical collar  You may purchase a soft collar over the counter ([facility], [employer], etc)  Wear for comfort  May remove when showering. Replace as needed when done showering.       Incisional Care  Your incision is closed with Steri Strips and dissolvable stitches under the skin.  Do not remove steri strips - they will fall off on their own. Any remaining strips will be removed at your 1 week post-op visit.     Bathing/Showering   Do not submerge your incision (tub, baths, or pools) for 4 weeks after surgery, once cleared by surgeon.  You may shower on post-op day 2.  You may remove the outer gauze covering on your incision. It is okay for steri strips to get wet.  After shower, pat incision dry with clean towel.  Do not apply any lotions or ointments to incision.  After showering, you may leave your incision open to air.      Diet and Constipation Prevention  Soft diet may help if you have discomfort while swallowing.  Cold drinks or food may feel more soothing.  Drink six to eight glasses liquid (water, juice) per day.  Eat a high fiber diet (bran, vegetables, fruit).  Use an over the counter stool softener such as Colace or Senakot while taking narcotics to prevent constipation, or add a laxative such as  Miralax or Milk of Magnesia as needed.  An enema or suppository may be needed if above measures do not work.       Pain Management     Relaxation techniques  A way to focus your attention other than on your pain. Your brain makes endorphins that are a natural body chemical that can decrease pain. Some examples of relaxation techniques are deep breathing, listening to music or meditating.     Medication  No NSAIDS until okay with surgeon.  NSAIDS (non-steroidal anti-inflammatory) include: Aspirin, Motrin, ibuprofen, Advil, Aleve, Naprosyn, Indocin, Nuprin, Vioxx, Celebrex, Bextra.   Tylenol (acetaminophen) is okay. Caution if your pain med contains Tylenol (acetaminophen); maximum 3 gms of Tylenol (acetaminophen) in 24 hours.  Take muscle relaxants and pain medications as prescribed allowing 30-45 minutes to take effect.  Do NOT drink alcohol while on pain medication.  Do NOT drive or operate machinery while taking narcotic medcations  Monitor need for pain medication refills closely.  Call pharmacy or physician office at least five days before out of pain medication. If calling physician office after 3 pm, it will be handled on the next business day.     Post-op Office Visit  Schedule 2 weeks after surgery with surgeon as directed at discharge  Schedule one week follow up with Primary Care Physician. Review all medications.     When to seek additional assistance:  Contact your surgeon:  Temp > 101F; take your temperature twice a day.  Increased pain, swelling, redness, or any drainage to incision.  Separation of incision.  Sudden reappearance of arm pain that won’t go away with pain medication.  Increased numbness or weakness.  Severe headaches.  Increased difficulty swallowing  Difficulty urinating or having a bowel movement     Go directly to the ER or CALL 911:  become short of breath  have chest pain  cough up blood  have unexplained anxiety with breathing

## 2024-11-15 NOTE — OCCUPATIONAL THERAPY NOTE
OCCUPATIONAL THERAPY EVALUATION - INPATIENT    Room Number: 353/353-A  Evaluation Date: 11/15/2024     Type of Evaluation: Initial  Presenting Problem: S/p C3-T2 fusion 11/14    Physician Order: IP Consult to Occupational Therapy  Reason for Therapy:  ADL/IADL Dysfunction and Discharge Planning    OCCUPATIONAL THERAPY ASSESSMENT   Patient is a 74 year old female admitted on 11/14/2024 with Presenting Problem: S/p C3-T2 fusion 11/14. Co-Morbidities : htn, dementia  Patient is currently functioning near baseline with toileting, lower body dressing, brace management, bed mobility, and transfers.  Prior to admission, patient's baseline is independent.  Patient met all OT goals at supervision level.  Patient reports no further questions/concerns at this time.     Discharge OT with no additional skilled services but increased support at home    Recommendations for nursing staff:   Transfers: x1 with walker  Toileting location: Toilet    EVALUATION SESSION:  Patient at start of session: bed    FUNCTIONAL TRANSFER ASSESSMENT  Sit to Stand: Edge of Bed; Chair  Edge of Bed: Supervision  Chair: Supervision  Toilet Transfer: Supervision    BED MOBILITY  Supine to Sit : Supervision  Sit to Supine (OT): Supervision    BALANCE ASSESSMENT     FUNCTIONAL ADL ASSESSMENT  Grooming Standing: Supervision  LB Dressing Seated: Supervision  Toileting Seated: Supervision    ACTIVITY TOLERANCE:                          O2 SATURATIONS       COGNITION  Safety Judgement:  decreased awareness of need for assistance  Awareness of Deficits:  decreased awareness of deficits    COGNITION ASSESSMENTS     Upper Extremity:   ROM: within functional limits   Strength: is within functional limits   Coordination:  Gross motor: intact  Fine motor: intact  Sensation: Light touch:  intact    EDUCATION PROVIDED  Patient Education : Role of Occupational Therapy; Plan of Care; Discharge Recommendations; DME Recommendations; Functional Transfer Techniques;  Surgical Precautions; Posture/Positioning; Energy Conservation; Proper Body Mechanics; Bracing Education Provided  Patient's Response to Education: Verbalized Understanding    Equipment used: walker, commode over toilet  Demonstrates functional use    Therapist comments: pt demos ability to perform bed mobility, toilet tx, and LB dressing with supervision. Pt educated on surgical precautions and use of aspen collar (when/how to don/doff). Pt verbalizes understanding    Patient End of Session: Up in chair;Call light within reach;All patient questions and concerns addressed;RN aware of session/findings;SCDs in place;Alarm set;Family present    OCCUPATIONAL PROFILE    HOME SITUATION  Type of Home: House     Lives With: Other (Comment) (grandson)    Toilet and Equipment: Standard height toilet  Shower/Tub and Equipment: Walk-in shower;Shower chair  Other Equipment: None          Drives: Yes       Prior Level of Function: independent    SUBJECTIVE  Pt pleasant and cooperative    PAIN ASSESSMENT  Ratin  Location: neck  Management Techniques: Repositioning;Activity promotion;Body mechanics    OBJECTIVE  Precautions: Bed/chair alarm  Fall Risk: High fall risk    WEIGHT BEARING RESTRICTION       AM-PAC ‘6-Clicks’ Inpatient Daily Activity Short Form  -   Putting on and taking off regular lower body clothing?: A Little  -   Bathing (including washing, rinsing, drying)?: A Little  -   Toileting, which includes using toilet, bedpan or urinal? : A Little  -   Putting on and taking off regular upper body clothing?: None  -   Taking care of personal grooming such as brushing teeth?: None  -   Eating meals?: None    AM-PAC Score:  Score: 21  Approx Degree of Impairment: 32.79%  Standardized Score (AM-PAC Scale): 44.27    ADDITIONAL TESTS     NEUROLOGICAL FINDINGS      PLAN   Patient has been evaluated and presents with no skilled Occupational Therapy needs at this time.  Patient discharged from Occupational Therapy services.   Please re-order if a new functional limitation presents during this admission.    OT Device Recommendations: Shower chair;3-in-1 commode    Patient Evaluation Complexity Level:   Occupational Profile/Medical History LOW - Brief history including review of medical or therapy records    Specific performance deficits impacting engagement in ADL/IADL LOW  1 - 3 performance deficits    Client Assessment/Performance Deficits LOW - No comorbidities nor modifications of tasks    Clinical Decision Making LOW - Analysis of occupational profile, problem-focused assessments, limited treatment options    Overall Complexity LOW     OT Session Time: 30 minutes  Self-Care Home Management: 15 minutes

## 2024-11-15 NOTE — PROGRESS NOTES
[facility]   part of [facility]     Hospitalist Progress Note     Anjelica Bran Patient Status:  Inpatient    3/20/1950 MRN [id number]   Location [facility] 3SW-A Attending SULY Cheek, DO   Hosp Day # 1 PCP ASHLEY CHEEK     Chief Complaint: Medical management     Subjective:     Patient resting in chair and reports feeling better today    Objective:    Review of Systems:   A comprehensive review of systems was completed; pertinent positive and negatives stated in subjective.    Vital signs:  Temp:  [97.2 °F (36.2 °C)-98.5 °F (36.9 °C)] 98 °F (36.7 °C)  Pulse:  [] 98  Resp:  [14-22] 22  BP: (123-186)/(49-80) 168/80  SpO2:  [91 %-98 %] 95 %    Physical Exam:    General: No acute distress  Respiratory: No wheezes, no rhonchi  Cardiovascular: S1, S2, regular rate and rhythm  Abdomen: Soft, Non-tender, non-distended, positive bowel sounds  Neuro: No new focal deficits.   Extremities: No edema    Diagnostic Data:    Labs:  Recent Labs   Lab 24  0645 11/15/24  0421   WBC 7.7  --    HGB 10.9* 10.2*   MCV 96.6  --    .0  --        Recent Labs   Lab 11/15/24  0422   *   BUN 19   CREATSERUM 1.43*   CA 8.9      K 4.1      CO2 20.0*       Estimated Creatinine Clearance: 29 mL/min (A) (based on SCr of 1.43 mg/dL (H)).    No results for input(s): \"TROP\", \"TROPHS\", \"CK\" in the last 168 hours.    No results for input(s): \"PTP\", \"INR\" in the last 168 hours.               Microbiology    No results found for this visit on 24.      Imaging: Reviewed in Epic.    Medications:    sennosides  17.2 mg Oral Nightly    docusate sodium  100 mg Oral BID    [START ON 2024] methocarbamol  500 mg Oral QID    methocarbamol  1,000 mg Intravenous Q8H    amLODIPine  5 mg Oral Daily    buPROPion ER  450 mg Oral Daily    cloNIDine  0.1 mg Oral Once per day on     donepezil  10 mg Oral Nightly    DULoxetine  30 mg Oral QPM    DULoxetine  60 mg Oral Daily     enalapril  20 mg Oral BID    fenofibrate micronized  134 mg Oral Daily with breakfast    hydroCHLOROthiazide  25 mg Oral Daily    labetalol  100 mg Oral QAM    levothyroxine  100 mcg Oral Before breakfast    memantine  5 mg Oral Daily    montelukast  10 mg Oral Nightly    pantoprazole  20 mg Oral QAM AC       Assessment & Plan:      #Cervical myelopathy  -S/p cervical laminectomy and fusion on 11/14  -Management per neurosurgery     #Hypertension  -Hydrochlorothiazide, amlodipine, clonidine, enalapril, labetalol     #Hyperlipidemia  -Fenofibrate     #Diabetes  -Not on meds at home      #Dementia  -Donepezil, memantine     #Hypothyroidism  -Levothyroxine     #CKD stage IIIb     #Anxiety  -Duloxetine, buspirone      Gabriela Wylie DO    Supplementary Documentation:     Quality:  DVT Mechanical Prophylaxis: TRACEY hose, SCDs,    DVT Pharmacologic Prophylaxis   Medication   None                Code Status: Not on file  García: No urinary catheter in place  García Duration (in days):   Central line:    BORA: 11/15/2024    The 21st Century Cures Act makes medical notes like these available to patients in the interest of transparency. Please be advised this is a medical document. Medical documents are intended to carry relevant information, facts as evident, and the clinical opinion of the practitioner. The medical note is intended as peer to peer communication and may appear blunt or direct. It is written in medical language and may contain abbreviations or verbiage that are unfamiliar.

## 2024-11-15 NOTE — PLAN OF CARE
Pt A&Ox4. VSS on RA. . García discontinued, due to void. Pain controlled with prn Norco, PCA discontinued. Telfa and Tegaderm dressing to posterior neck dry and intact. Ice applied. Hemovac drain intact and functioning. Aspen collar on when OOB. Post-op XR pending. Call light within reach. Son at bedside. Will continue to monitor.

## 2024-11-15 NOTE — CM/SW NOTE
11/15/24 1100   CM/SW Referral Data   Referral Source Physician   Reason for Referral Discharge planning   Informant Patient   Patient Info   Patient's Current Mental Status at Time of Assessment Alert;Oriented   Patient's Home Environment House   Number of Levels in Home 1   Patient lives with Grandchild   Patient Status Prior to Admission   Independent with ADLs and Mobility Yes   Discharge Needs   Anticipated D/C needs Home health care   Choice of Post-Acute Provider   Informed patient of right to choose their preferred provider Yes     Self referred to case for discharge planning.   Pt is a 74 year old female admitted for PCDF    Met w/pt and her son at the bedside. She lives in a one story home with her 22 year old grandson, but according to pt, he is not able to assist her.  She has 3 grown sons nearby who are available and drive her to appointments/errands.  She has a friend who assists with household chores/driving as well    Pt has a walker at home, but does not use  PT worked w/pt and Anticipated therapy need: Home with Home Healthcare    Pt's son Nura at bedside expressed concern that pt may get up in the middle of the night at home and fall.  Grandson lives in the basement and may not hear her.  States she does not use her walker, to which pt  replied \"I will from now on\"  Discussed possibility of [facility] or similar system and they've been considering.   They asked about possibility of SNF.  Explained that 3 medically necessary midnights would be needed for Medicare to cover. She is not interested in self pay.  Pt said her doctor told her she's staying through the weekend.     Reviewed why home is best option, but sent referrals for both [facility] and HH via [employer]    [address] 1700  Met w/pt with lists of accepting HH and [facility].  She is hoping she can go home w/HH, but will discuss w/family and notify SW tomorrow morning.    PASRR dne and [facility] review requested    / to remain  available for support and/or discharge planning.     Joana Duff MBA MSN, RN CTL/  [phone]

## 2024-11-16 LAB
ANION GAP SERPL CALC-SCNC: 3 MMOL/L (ref 0–18)
BUN BLD-MCNC: 25 MG/DL (ref 9–23)
CALCIUM BLD-MCNC: 9 MG/DL (ref 8.7–10.4)
CHLORIDE SERPL-SCNC: 108 MMOL/L (ref 98–112)
CO2 SERPL-SCNC: 27 MMOL/L (ref 21–32)
CREAT BLD-MCNC: 1.22 MG/DL
EGFRCR SERPLBLD CKD-EPI 2021: 47 ML/MIN/1.73M2 (ref 60–?)
GLUCOSE BLD-MCNC: 113 MG/DL (ref 70–99)
OSMOLALITY SERPL CALC.SUM OF ELEC: 291 MOSM/KG (ref 275–295)
POTASSIUM SERPL-SCNC: 3.8 MMOL/L (ref 3.5–5.1)
SODIUM SERPL-SCNC: 138 MMOL/L (ref 136–145)

## 2024-11-16 PROCEDURE — 99232 SBSQ HOSP IP/OBS MODERATE 35: CPT | Performed by: STUDENT IN AN ORGANIZED HEALTH CARE EDUCATION/TRAINING PROGRAM

## 2024-11-16 NOTE — PROGRESS NOTES
[facility]   part of [facility]     Hospitalist Progress Note     Anjelica Bran Patient Status:  Inpatient    3/20/1950 MRN [id number]   Location [facility] 3SW-A Attending SULY Cheek, DO   Hosp Day # 2 PCP ASHLEY CHEEK     Chief Complaint: Medical management     Subjective:     Patient resting in bed and feels well     Objective:    Review of Systems:   A comprehensive review of systems was completed; pertinent positive and negatives stated in subjective.    Vital signs:  Temp:  [97.7 °F (36.5 °C)-98.6 °F (37 °C)] 98.6 °F (37 °C)  Pulse:  [] 88  Resp:  [18-20] 18  BP: (148-162)/(58-89) 158/66  SpO2:  [90 %-97 %] 93 %    Physical Exam:    General: No acute distress  Respiratory: No wheezes, no rhonchi  Cardiovascular: S1, S2, regular rate and rhythm  Abdomen: Soft, Non-tender, non-distended, positive bowel sounds  Neuro: No new focal deficits.   Extremities: No edema    Diagnostic Data:    Labs:  Recent Labs   Lab 24  0645 11/15/24  0421   WBC 7.7  --    HGB 10.9* 10.2*   MCV 96.6  --    .0  --        Recent Labs   Lab 11/15/24  0422 24  0406   * 113*   BUN 19 25*   CREATSERUM 1.43* 1.22*   CA 8.9 9.0    138   K 4.1 3.8    108   CO2 20.0* 27.0       Estimated Creatinine Clearance: 34 mL/min (A) (based on SCr of 1.22 mg/dL (H)).    No results for input(s): \"TROP\", \"TROPHS\", \"CK\" in the last 168 hours.    No results for input(s): \"PTP\", \"INR\" in the last 168 hours.               Microbiology    No results found for this visit on 24.      Imaging: Reviewed in Epic.    Medications:    sennosides  17.2 mg Oral Nightly    docusate sodium  100 mg Oral BID    [START ON 2024] methocarbamol  500 mg Oral QID    methocarbamol  1,000 mg Intravenous Q8H    amLODIPine  5 mg Oral Daily    buPROPion ER  450 mg Oral Daily    cloNIDine  0.1 mg Oral Once per day on     donepezil  10 mg Oral Nightly    DULoxetine  30 mg Oral QPM     DULoxetine  60 mg Oral Daily    enalapril  20 mg Oral BID    fenofibrate micronized  134 mg Oral Daily with breakfast    hydroCHLOROthiazide  25 mg Oral Daily    labetalol  100 mg Oral QAM    levothyroxine  100 mcg Oral Before breakfast    memantine  5 mg Oral Daily    montelukast  10 mg Oral Nightly    pantoprazole  20 mg Oral QAM AC       Assessment & Plan:      #Cervical myelopathy  -S/p cervical laminectomy and fusion on 11/14  -Management per neurosurgery     #Hypertension  -Hydrochlorothiazide, amlodipine, clonidine, enalapril, labetalol     #Hyperlipidemia  -Fenofibrate     #Diabetes  -Not on meds at home      #Dementia  -Donepezil, memantine     #Hypothyroidism  -Levothyroxine     #CKD stage IIIb     #Anxiety  -Duloxetine, buspirone      Gabriela Wylie DO    Supplementary Documentation:     Quality:  DVT Mechanical Prophylaxis: TRACEY hose, SCDs,    DVT Pharmacologic Prophylaxis   Medication   None                Code Status: Not on file  García: No urinary catheter in place  García Duration (in days):   Central line:    BORA:     The 21st Century Cures Act makes medical notes like these available to patients in the interest of transparency. Please be advised this is a medical document. Medical documents are intended to carry relevant information, facts as evident, and the clinical opinion of the practitioner. The medical note is intended as peer to peer communication and may appear blunt or direct. It is written in medical language and may contain abbreviations or verbiage that are unfamiliar.

## 2024-11-16 NOTE — PLAN OF CARE
Pt on bed, Aox3, VSS on RA. Physical assessment competed. Due meds given. Updated on POC. Cervical collar when OOB. Encouraged IS, SCDs in place. Safety precaution in place. Call light within reach. Kept rested

## 2024-11-16 NOTE — CM/SW NOTE
Unable to get hold of pt in room. Spoke with son Kishor, who states that they would prefer the [place] location as it's closer to pt. Spoke with  at [facility] who states, Katty in admissions is not in for the weekend. It appears that [facility] is also under the same management as [facility] as they all have same phone number.     Sent message via [employer] requesting [place] location. Per Kishor, his brother Yassine will be visiting pt later today and they will discuss [facility] choice.     Nella Gamble, MSN RN, CM  [phone]

## 2024-11-16 NOTE — PLAN OF CARE
Pt A&Ox4. VSS on RA. . Telfa and Tegaderm dressing to posterior neck dry and intact. Ice applied. Hemovac drain intact and functioning. Pain controlled with prn norco. Aspen collar on when OOB. PT rec home with home health, pt family wants [facility] - referrals sent. Call light within reach. Will continue to monitor.

## 2024-11-16 NOTE — PROGRESS NOTES
[facility]   part of [facility]    Neurosurgery Progress Note  2024    Anjelica Bran Patient Status:  Inpatient    3/20/1950 MRN [id number]   Location [facility] 3SW-A Attending SULY Cheek, DO   Hosp Day # 2 PCP ASHLEY CHEEK     SUBJECTIVE:  Anjelica Bran is a(n) 74 year old female postoperative day 2, C3-T2 decompression and fusion.  Overall, the patient is doing well.  She reports significant incisional pain.  This is reasonably well-controlled with medication.      OBJECTIVE / PHYSICAL EXAM:  Vital Signs:  Blood pressure 158/66, pulse 88, temperature 98.6 °F (37 °C), temperature source Oral, resp. rate 18, height 63\", weight 188 lb (85.3 kg), SpO2 93%.  Bright and awake.  Strength is good throughout, with the exception of some mild right upper extremity weakness.  The patient reports this was present for several months prior to the operation.  No adverse change since the operation.      Lab Results (last 24 hours):  Recent Results (from the past 24 hours)   Basic Metabolic Panel (8)    Collection Time: 24  4:06 AM   Result Value Ref Range    Glucose 113 (H) 70 - 99 mg/dL    Sodium 138 136 - 145 mmol/L    Potassium 3.8 3.5 - 5.1 mmol/L    Chloride 108 98 - 112 mmol/L    CO2 27.0 21.0 - 32.0 mmol/L    Anion Gap 3 0 - 18 mmol/L    BUN 25 (H) 9 - 23 mg/dL    Creatinine 1.22 (H) 0.55 - 1.02 mg/dL    Calcium, Total 9.0 8.7 - 10.4 mg/dL    Calculated Osmolality 291 275 - 295 mOsm/kg    eGFR-Cr 47 (L) >=60 mL/min/1.73m2       Review of Imaging  No new imaging    Assessment/Plan:  1.  Cervical spondylosis, status post C3-T2 decompression and fusion.    Drain output 145 cc over 24 hours.  Continue drain  PT/OT evaluation in progress    Anticipate discharge home with home health next 24-48 hours, pending clinical progress        2024  9:40 AM    This report was dictated using voice recognition technology.  Errors in syntax or recognition may occur, and should not be construed to  change the meaning of a sentence.

## 2024-11-17 LAB
ANION GAP SERPL CALC-SCNC: 1 MMOL/L (ref 0–18)
BUN BLD-MCNC: 19 MG/DL (ref 9–23)
CALCIUM BLD-MCNC: 9 MG/DL (ref 8.7–10.4)
CHLORIDE SERPL-SCNC: 107 MMOL/L (ref 98–112)
CO2 SERPL-SCNC: 31 MMOL/L (ref 21–32)
CREAT BLD-MCNC: 1.16 MG/DL
EGFRCR SERPLBLD CKD-EPI 2021: 50 ML/MIN/1.73M2 (ref 60–?)
GLUCOSE BLD-MCNC: 92 MG/DL (ref 70–99)
OSMOLALITY SERPL CALC.SUM OF ELEC: 290 MOSM/KG (ref 275–295)
POTASSIUM SERPL-SCNC: 4.1 MMOL/L (ref 3.5–5.1)
SODIUM SERPL-SCNC: 139 MMOL/L (ref 136–145)

## 2024-11-17 PROCEDURE — 99232 SBSQ HOSP IP/OBS MODERATE 35: CPT | Performed by: STUDENT IN AN ORGANIZED HEALTH CARE EDUCATION/TRAINING PROGRAM

## 2024-11-17 RX ORDER — METOCLOPRAMIDE HYDROCHLORIDE 5 MG/ML
5 INJECTION INTRAMUSCULAR; INTRAVENOUS EVERY 8 HOURS PRN
Status: DISCONTINUED | OUTPATIENT
Start: 2024-11-17 | End: 2024-11-18

## 2024-11-17 RX ORDER — LABETALOL HYDROCHLORIDE 5 MG/ML
10 INJECTION, SOLUTION INTRAVENOUS EVERY 4 HOURS PRN
Status: DISCONTINUED | OUTPATIENT
Start: 2024-11-17 | End: 2024-11-18

## 2024-11-17 RX ORDER — HYDRALAZINE HYDROCHLORIDE 20 MG/ML
5 INJECTION INTRAMUSCULAR; INTRAVENOUS EVERY 4 HOURS PRN
Status: DISCONTINUED | OUTPATIENT
Start: 2024-11-17 | End: 2024-11-18

## 2024-11-17 NOTE — PROGRESS NOTES
[facility]   part of [facility]     Hospitalist Progress Note     Anjelica Bran Patient Status:  Inpatient    3/20/1950 MRN [id number]   Location [facility] 3SW-A Attending SULY Cheek, DO   Hosp Day # 3 PCP ASHLEY CHEEK     Chief Complaint: Medical management     Subjective:     Patient resting in chair and reports feeling well. She denies any pain     Objective:    Review of Systems:   A comprehensive review of systems was completed; pertinent positive and negatives stated in subjective.    Vital signs:  Temp:  [97.6 °F (36.4 °C)-98.5 °F (36.9 °C)] 98.1 °F (36.7 °C)  Pulse:  [75-90] 80  Resp:  [17-20] 18  BP: (139-179)/(55-73) 154/71  SpO2:  [90 %-95 %] 94 %    Physical Exam:    General: No acute distress  Respiratory: No wheezes, no rhonchi  Cardiovascular: S1, S2, regular rate and rhythm  Abdomen: Soft, Non-tender, non-distended, positive bowel sounds  Neuro: No new focal deficits.   Extremities: No edema    Diagnostic Data:    Labs:  Recent Labs   Lab 24  0645 11/15/24  0421   WBC 7.7  --    HGB 10.9* 10.2*   MCV 96.6  --    .0  --        Recent Labs   Lab 11/15/24  0422 24  0406 24  0438   * 113* 92   BUN 19 25* 19   CREATSERUM 1.43* 1.22* 1.16*   CA 8.9 9.0 9.0    138 139   K 4.1 3.8 4.1    108 107   CO2 20.0* 27.0 31.0       Estimated Creatinine Clearance: 35.7 mL/min (A) (based on SCr of 1.16 mg/dL (H)).    No results for input(s): \"TROP\", \"TROPHS\", \"CK\" in the last 168 hours.    No results for input(s): \"PTP\", \"INR\" in the last 168 hours.               Microbiology    No results found for this visit on 24.      Imaging: Reviewed in Epic.    Medications:    sennosides  17.2 mg Oral Nightly    docusate sodium  100 mg Oral BID    methocarbamol  500 mg Oral QID    amLODIPine  5 mg Oral Daily    buPROPion ER  450 mg Oral Daily    cloNIDine  0.1 mg Oral Once per day on     donepezil  10 mg Oral Nightly     DULoxetine  30 mg Oral QPM    DULoxetine  60 mg Oral Daily    enalapril  20 mg Oral BID    fenofibrate micronized  134 mg Oral Daily with breakfast    hydroCHLOROthiazide  25 mg Oral Daily    labetalol  100 mg Oral QAM    levothyroxine  100 mcg Oral Before breakfast    memantine  5 mg Oral Daily    montelukast  10 mg Oral Nightly    pantoprazole  20 mg Oral QAM AC       Assessment & Plan:      #Cervical myelopathy  -S/p cervical laminectomy and fusion on 11/14  -Drain in place   -Management per neurosurgery     #Hypertension  -Hydrochlorothiazide, amlodipine, clonidine, enalapril, labetalol     #Hyperlipidemia  -Fenofibrate     #Diabetes  -Not on meds at home      #Dementia  -Donepezil, memantine     #Hypothyroidism  -Levothyroxine     #CKD stage IIIb     #Anxiety  -Duloxetine, buspirone      Gabriela Wylie DO    Supplementary Documentation:     Quality:  DVT Mechanical Prophylaxis: TRACEY hose, SCDs,    DVT Pharmacologic Prophylaxis   Medication   None                Code Status: Not on file  García: No urinary catheter in place  García Duration (in days):   Central line:    BORA:     The 21st Century Cures Act makes medical notes like these available to patients in the interest of transparency. Please be advised this is a medical document. Medical documents are intended to carry relevant information, facts as evident, and the clinical opinion of the practitioner. The medical note is intended as peer to peer communication and may appear blunt or direct. It is written in medical language and may contain abbreviations or verbiage that are unfamiliar.

## 2024-11-17 NOTE — PLAN OF CARE
Pt A&Ox4. VSS on RA. . Telfa and Tegaderm dressing to posterior neck dry and intact. Ice applied. Hemovac drain intact and functioning. Pain controlled with prn Norco and scheduled robaxin. [place] collar on when OOB. PT rec home with home health, pt family wants [facility] - referrals sent. Call light within reach. Will continue to monitor.

## 2024-11-17 NOTE — PHYSICAL THERAPY NOTE
PHYSICAL THERAPY TREATMENT NOTE - INPATIENT    Room Number: 353/353-A     Session: 1     Number of Visits to Meet Established Goals: 2    Presenting Problem: posterior C3-C7 laminectomy and fusion with C3-T2 with lateral mass and pedicle screws and graft  Co-Morbidities : htn, dementia    PHYSICAL THERAPY ASSESSMENT   Patient demonstrates good  progress this session, goals  remain in progress.      Patient is requiring stand-by assist and minimal assist as a result of the following impairments: decreased functional strength, pain, impaired standing balance, impaired coordination, decreased muscular endurance, and limited cervical ROM.     Patient continues to function below baseline with bed mobility, transfers, gait, stair negotiation, standing prolonged periods, and performing household tasks.  Next session anticipate patient to progress bed mobility, transfers, gait, and standing prolonged periods.  Physical Therapy will continue to follow patient for duration of hospitalization.    Patient continues to benefit from continued skilled PT services: at discharge to promote prior level of function.  Anticipate patient will return home with home health PT with assist at home    PLAN DURING HOSPITALIZATION  Nursing Mobility Recommendation : 1 Assist  PT Device Recommendation: Rolling walker  PT Treatment Plan: Body mechanics;Endurance;Energy conservation;Patient education;Family education;Gait training;Strengthening;Transfer training;Balance training  Frequency (Obs): Daily     CURRENT GOALS  Goal #1  Patient is able to demonstrate supine - sit EOB @ level: Eben      Goal #2  Patient is able to demonstrate transfers Sit to/from Stand at assistance level: Eben   Goal #3     Patient is able to ambulate 150 feet with assistive device at assistance level: supervision   Goal #4     Patient will negotiate 4 stairs/one curb w/ assistive device and supervision   Goal #5     Patient verbalizes and/or demonstrates all  precautions and safety concerns independently    Goal #6        Goal Comments: Goals established on 11/15/2024                                2024 all goals ongoing    SUBJECTIVE  It\"s the pain otherwise I can go home  C/o weakness on B UE limiting her ability to easily don/doff cervical spine    OBJECTIVE  Precautions: Bed/chair alarm    WEIGHT BEARING RESTRICTION     PAIN ASSESSMENT   Ratin  Location: incisional  Management Techniques: Activity promotion;Body mechanics;Repositioning    BALANCE                                                                                                                       Static Sitting: Good  Dynamic Sitting: Good           Static Standing: Good  Dynamic Standing: Fair    ACTIVITY TOLERANCE: fair- limited by pain        O2 WALK       AM-PAC '6-Clicks' INPATIENT SHORT FORM - BASIC MOBILITY  How much difficulty does the patient currently have...  Patient Difficulty: Turning over in bed (including adjusting bedclothes, sheets and blankets)?: A Little   Patient Difficulty: Sitting down on and standing up from a chair with arms (e.g., wheelchair, bedside commode, etc.): A Little   Patient Difficulty: Moving from lying on back to sitting on the side of the bed?: A Little   How much help from another person does the patient currently need...   Help from Another: Moving to and from a bed to a chair (including a wheelchair)?: A Little   Help from Another: Need to walk in hospital room?: A Little   Help from Another: Climbing 3-5 steps with a railing?: A Little     AM-PAC Score:  Raw Score: 18   Approx Degree of Impairment: 46.58%   Standardized Score (AM-PAC Scale): 43.63   CMS Modifier (G-Code): CK    FUNCTIONAL ABILITY STATUS  Gait Assessment   Functional Mobility/Gait Assessment  Gait Assistance: Contact guard assist;Supervision  Distance (ft): 150  Assistive Device: Rolling walker  Pattern:  (slight ataxia)    Skilled Therapy Provided    Bed Mobility:  Rolling:  NT   Supine<>Sit: NT   Sit<>Supine: NT     Transfer Mobility:  Sit<>Stand: SBA   Stand<>Sit: SBA   Gait: CGA with ambulation, RW as support    Therapist's Comments:   Pain is the main limiting factor in her functional mobilities and task tolerance  Pain is not well controlled at this time  Up on the chair sans cervical collar and discussed the rationale of C-collar use  Needs assistance in C- collar doffing and donning, limited with her weakness on B LE and pain with functional use of B UE that it take extra time to do on her own with decrease grasping of hands  Discussed using mirror to assist in self donning and other strategies to do   Discussed on maintaining  C- posture at all time to good healing and pain management  Stated pain is better with C-spine on  Stated that she has good family support  Encourage to cont mobilization with staff while in hosp  Addressed all issues and concerns. Nursing is aware of this visit.      Patient End of Session: Up in chair;Needs met;Call light within reach;RN aware of session/findings;All patient questions and concerns addressed;Alarm set;Bracing education provided per handout;SCDs in place    PT Session Time: 30 minutes  Gait Training: 10 minutes  Therapeutic Activity: 15 minutes

## 2024-11-17 NOTE — PROGRESS NOTES
[facility]   part of [facility]    Neurosurgery Progress Note  2024    Anjelica Bran Patient Status:  Inpatient    3/20/1950 MRN [id number]   Location [facility] 3SW-A Attending SULY Cheek, DO   Hosp Day # 3 PCP ASHLEY CHEEK     SUBJECTIVE:  Anjelica Bran is a(n) 74 year old female postoperative day 3, C3-T2 decompression and fusion.  The patient is doing reasonably well.  She continues to have some incisional pain, which is controlled with positioning and medication.      OBJECTIVE / PHYSICAL EXAM:  Vital Signs:  Blood pressure 154/71, pulse 80, temperature 98.1 °F (36.7 °C), temperature source Oral, resp. rate 18, height 63\", weight 188 lb (85.3 kg), SpO2 94%.  On examination, strength remains 5/5.      Lab Results (last 24 hours):  Recent Results (from the past 24 hours)   Basic Metabolic Panel (8)    Collection Time: 24  4:38 AM   Result Value Ref Range    Glucose 92 70 - 99 mg/dL    Sodium 139 136 - 145 mmol/L    Potassium 4.1 3.5 - 5.1 mmol/L    Chloride 107 98 - 112 mmol/L    CO2 31.0 21.0 - 32.0 mmol/L    Anion Gap 1 0 - 18 mmol/L    BUN 19 9 - 23 mg/dL    Creatinine 1.16 (H) 0.55 - 1.02 mg/dL    Calcium, Total 9.0 8.7 - 10.4 mg/dL    Calculated Osmolality 290 275 - 295 mOsm/kg    eGFR-Cr 50 (L) >=60 mL/min/1.73m2       Review of Imaging  No new imaging    Assessment/Plan:  1.  Cervical myelopathy, status post decompression and fusion.  Overall, the patient is making reasonable progress.    Drain output 125 cc over 24 hours.    Continue drain until tomorrow    Discharge planning ongoing.  Anticipate home versus subacute rehab next 24 hours.            2024  9:30 AM    This report was dictated using voice recognition technology.  Errors in syntax or recognition may occur, and should not be construed to change the meaning of a sentence.

## 2024-11-17 NOTE — PLAN OF CARE
Alert and oriented x 4. VSS; on 2L while sleeping. Pain controlled with PRN medication. Dressings clean, dry, and intact. Voiding without difficulty. Tolerating regular diet. Up standby with a walker. Hemovac drain in place, monitoring output. POC discussed with patient. Safety precautions in place. Call light within reach.

## 2024-11-18 VITALS
HEIGHT: 63 IN | OXYGEN SATURATION: 94 % | BODY MASS INDEX: 33.31 KG/M2 | SYSTOLIC BLOOD PRESSURE: 169 MMHG | WEIGHT: 188 LBS | TEMPERATURE: 99 F | DIASTOLIC BLOOD PRESSURE: 64 MMHG | HEART RATE: 86 BPM | RESPIRATION RATE: 20 BRPM

## 2024-11-18 PROCEDURE — 99232 SBSQ HOSP IP/OBS MODERATE 35: CPT | Performed by: STUDENT IN AN ORGANIZED HEALTH CARE EDUCATION/TRAINING PROGRAM

## 2024-11-18 NOTE — PROGRESS NOTES
Patient discharged to [facility] via medicar transport.  Discharge education taught, patient verbalizes understanding.  Belongings sent with patient.  Meds to Beds, delivered.  Multiple attempts to call facility and give report, no response, will await call back.

## 2024-11-18 NOTE — PLAN OF CARE
A&Ox4. VSS, HTN noted. On room air. . IS encouraged. SCDs on BLE. Ankle pumps encouraged. Tolerating regular diet. Voiding freely. Pain controlled. Coverlet to posterior neck, C/D/I. Aspen collar on and aligned. Hemovac drain removed by PA. Ambulating with minimal assist. Plan is to dc to [facility] today. Patient updated on plan of care. Safety precautions in place. Call light within reach.

## 2024-11-18 NOTE — PLAN OF CARE
Alert and oriented x 4. VSS; on 2L O2 NC while sleeping. Pain controlled with PRN medication. Dressings clean, dry, and intact. Voiding without difficulty. Tolerating regular diet. Up standby with a walker. Hemovac drain in place, monitoring output. POC discussed with patient. Safety precautions in place. Call light within reach.       @2100 BP was 180/67. PRN hydralazine given @2112 and it was still in the 170s so   PRN labetalol given @2225. BP rechecked in one hour and was 173/69 @2325. Hosp notified and stated as long as under 180 we can wait for pain meds to kick in. Norco x2 tablets administrated @2335.

## 2024-11-18 NOTE — PHYSICAL THERAPY NOTE
PHYSICAL THERAPY TREATMENT NOTE - INPATIENT    Room Number: 353/353-A     Session: 2     Number of Visits to Meet Established Goals: 2    Presenting Problem: posterior C3-C7 laminectomy and fusion with C3-T2 with lateral mass and pedicle screws and graft  Co-Morbidities : htn, dementia    Procedure Performed 11/14/24 Dr Cheek:   Posterior cervical 3  - cervical 4, cervical 4 - cervical 5, cervical 5 - cervical 6, cervical 6 - cervical 7 laminectomy and fusion from cervical 3- 4, cervical 4-5, cervical 5-6, cervical 6 -7, cervical 7- thoracic 1 and thoracic 1 - 2 with lateral mass and pedicle screws and bone graft.    History related to current admission: Patient is a 74 year old female admitted on 11/14/2024 from home for planned cervical spine surgery.     HOME SITUATION  Type of Home: House   Home Layout: One level;Ramped entrance  Lives With: Other (Comment) (grandson)  Drives: No  Patient Regularly Uses: Glasses;Cane;Rolling walker (electric scooter)     Prior Level of [place]:   Per pt lives in one story home with ramped entrance- 22 year old grandson lives with her (but per son he is not much help to her). Pt's three sons all live within a quarter mile block of each other ([place] farm). Pt reports she fluctuates between using electric scooter, RW, or cane. Uses RW when out in community. No hx of falls. No longer drives. Family provides transportation to/from appts, etc. Pt reports her daughter-in-laws will be helpful at time of dc.     PHYSICAL THERAPY ASSESSMENT   Patient demonstrates excellent progress this session, goals   met this session 11/18/24 .    Patient is requiring supervision as a result of the following impairments: decreased functional strength, pain, impaired dynamic standing balance, impaired motor planning, and decreased muscular endurance.   Patient continues to benefit from continued skilled PT services: at discharge to promote prior level of function and safety with additional  support and return home with home health PT.    PLAN DURING HOSPITALIZATION  Nursing Mobility Recommendation : 1 Assist  PT Device Recommendation: Rolling walker  PT Treatment Plan: Body mechanics;Endurance;Energy conservation;Patient education;Family education;Gait training;Strengthening;Transfer training;Balance training  Frequency (Obs): Daily     CURRENT GOALS  Goal #1  Patient is able to demonstrate supine - sit EOB @ level: Eben      Goal #2  Patient is able to demonstrate transfers Sit to/from Stand at assistance level: Eben   Goal #3     Patient is able to ambulate 150 feet with assistive device at assistance level: supervision   Goal #4     Patient will negotiate 4 stairs/one curb w/ assistive device and supervision   Goal #5     Patient verbalizes and/or demonstrates all precautions and safety concerns independently    Goal #6        Goal Comments: Goals established on 11/15/2024  2024 all goals achieved     SUBJECTIVE  \"It still hurts a lot.\"    OBJECTIVE  Precautions: Bed/chair alarm    WEIGHT BEARING RESTRICTION     PAIN ASSESSMENT   Ratin  Location: incisional  Management Techniques: Activity promotion;Body mechanics;Repositioning    BALANCE                                                                                                                       Static Sitting: Good  Dynamic Sitting: Fair +           Static Standing: Fair  Dynamic Standing: Fair -    ACTIVITY TOLERANCE                         O2 WALK       AM-PAC '6-Clicks' INPATIENT SHORT FORM - BASIC MOBILITY  How much difficulty does the patient currently have...  Patient Difficulty: Turning over in bed (including adjusting bedclothes, sheets and blankets)?: A Little   Patient Difficulty: Sitting down on and standing up from a chair with arms (e.g., wheelchair, bedside commode, etc.): A Little   Patient Difficulty: Moving from lying on back to sitting on the side of the bed?: A Little   How much help from another person does  the patient currently need...   Help from Another: Moving to and from a bed to a chair (including a wheelchair)?: A Little   Help from Another: Need to walk in hospital room?: A Little   Help from Another: Climbing 3-5 steps with a railing?: A Little     AM-PAC Score:  Raw Score: 18   Approx Degree of Impairment: 46.58%   Standardized Score (AM-PAC Scale): 43.63   CMS Modifier (G-Code): CK    FUNCTIONAL ABILITY STATUS  Gait Assessment   Functional Mobility/Gait Assessment  Gait Assistance: Supervision  Distance (ft): 100. 200  Assistive Device: Rolling walker  Pattern: Within Functional Limits  Stairs: Stairs  How Many Stairs: 4  Device: 1 Rail;2 Rails  Assist: Supervision    Skilled Therapy Provided  Educated on spine precautions: no bending, lifting, twisting, limiting overhead movements  Educated on use of Aspen collar   Educated on importance of continued out of bed mobility and ambulation with use of RW  Encouraged use of ice for pain and swelling management  Educated on use of RW at all times for balance and safety    Sit to stand to RW> ambulated 100 feet with RW> ascended/descended 4 steps with rails> ambulated 200 feet with RW> completed seated therapeutic exercises with good tolerance> upright in chair at end of session    Bed Mobility:  Rolling: NT   Supine<>Sit: NT   Sit<>Supine: NT     Transfer Mobility:  Sit<>Stand: Eben to RW- vc for hand placement   Stand<>Sit: Eben   Gait: supervision with RW x 100 feet, 200 feet- flexed posturing, cues for upright posture. Minor shuffle.     Therapist's Comments:   Patient presents sitting up in bedside chair. Discussed role of therapy and goals of today's session. Pt in agreement to session. Reports improvement in pain from 10 to 8 after receiving pain medication.     THERAPEUTIC EXERCISES  Lower Extremity Alternating marching  Ankle pumps  Heel raises  LAQ  SLR     Upper Extremity Elbow flex/ext and  - open/close     Position Sitting     Repetitions   10    Sets   1     Patient End of Session: Up in chair;Needs met;Call light within reach;RN aware of session/findings;All patient questions and concerns addressed;Hospital anti-slip socks;Alarm set;Discussed recommendations with /    PT Session Time: 30 minutes  Gait Trainin minutes  Therapeutic Exercise:10 minutes

## 2024-11-18 NOTE — PROGRESS NOTES
[facility]  Neurosurgery Progress Note    Anjelica Bran Patient Status:  Inpatient    3/20/1950 MRN [id number]   Location [facility] 3SW-A Attending SULY Cheek, DO   Hosp Day # 4 PCP ASHLEY CHEEK     PRINCIPLE PROBLEM:   S/p C3-T2 fusion POD #4    SUBJECTIVE     Patient reports expected post-op muscle soreness to the posterior neck, shoulders, and upper back.  Current pain regimen is providing adequate control when taken as prescribed.  She has no new complaints otherwise.    OBJECTIVE/PHYSICAL EXAM     VITALS:  BP (!) 169/64 (BP Location: Left arm)   Pulse 86   Temp 98.6 °F (37 °C) (Oral)   Resp 20   Ht 63\"   Wt 188 lb (85.3 kg)   SpO2 94%   BMI 33.30 kg/m²     GENERAL: No acute distress, non-toxic appearing, mood appropriate    HEENT: Normocephalic, atraumatic    RESP:  Respirations non-labored, even    CV:  NSR on tele    NEURO: Alert and oriented x3.  Able to follow commands.  Comprehension intact.  Speech clear, fluent. Sensation to light touch is intact bilaterally. Strength 5/5 in all extremities. CAZARES x 4. Gait deferred.     SKIN: Surgical dressing C/D/I    DRAINS: Hemovac drain intact with 105 mL output in 24 hours.    LABS      No new labs to review.     IMAGING     No results found.    ASSESSMENT & PLAN     ASSESSMENT:  S/p C3-T2 fusion POD #4    PLAN:  Pain medications as ordered  Drain discontinued   [place] collar when OOB  Encourage use of IS  PT/OT  Medical management per hospitalist  Disposition: [facility] vs [facility] - discussed with HEATHER Farias to [place] from a Neurosurgical standpoint pending K[facility] availability    The above plan was discussed with Dr. Cheek.    Odilia Barakat, APRN-NP  [facility]  2024, 8:20 AM

## 2024-11-18 NOTE — CONGREGATE LIVING REVIEW
Congregate Living Authorization    [facility] ([facility] has reviewed this case and the committee DOES NOT RECOMMEND discharge to a skilled nursing facility under skilled care.    The CLRC recommends:  Home with home health and any other appropriate caregiver assistance or medical equipment as needed vs respite in SNF.     Does not appear to have skilled services for KAREN, but additional home support appears to be needed.    For questions regarding CLRC approval process, please contact the CM assigned to the case.  For questions regarding RN discharge workflow, please contact the unit Clinical Leader.

## 2024-11-18 NOTE — PROGRESS NOTES
[facility]   part of [facility]     Hospitalist Progress Note     Anjelica Bran Patient Status:  Inpatient    3/20/1950 MRN [id number]   Location [facility] 3SW-A Attending SULY Cheek, DO   Hosp Day # 4 PCP ASHLEY CHEEK     Chief Complaint: Medical management     Subjective:     Patient feels well with no complaints     Objective:    Review of Systems:   A comprehensive review of systems was completed; pertinent positive and negatives stated in subjective.    Vital signs:  Temp:  [97.6 °F (36.4 °C)-98.6 °F (37 °C)] 98.6 °F (37 °C)  Pulse:  [74-86] 86  Resp:  [18-20] 20  BP: (163-192)/(60-83) 169/64  SpO2:  [90 %-95 %] 94 %    Physical Exam:    General: No acute distress  Respiratory: No wheezes, no rhonchi  Cardiovascular: S1, S2, regular rate and rhythm  Abdomen: Soft, Non-tender, non-distended, positive bowel sounds  Neuro: No new focal deficits.   Extremities: No edema    Diagnostic Data:    Labs:  Recent Labs   Lab 24  0645 11/15/24  0421   WBC 7.7  --    HGB 10.9* 10.2*   MCV 96.6  --    .0  --        Recent Labs   Lab 11/15/24  0422 24  0406 24  0438   * 113* 92   BUN 19 25* 19   CREATSERUM 1.43* 1.22* 1.16*   CA 8.9 9.0 9.0    138 139   K 4.1 3.8 4.1    108 107   CO2 20.0* 27.0 31.0       Estimated Creatinine Clearance: 35.7 mL/min (A) (based on SCr of 1.16 mg/dL (H)).    No results for input(s): \"TROP\", \"TROPHS\", \"CK\" in the last 168 hours.    No results for input(s): \"PTP\", \"INR\" in the last 168 hours.               Microbiology    No results found for this visit on 24.      Imaging: Reviewed in Epic.    Medications:    sennosides  17.2 mg Oral Nightly    docusate sodium  100 mg Oral BID    methocarbamol  500 mg Oral QID    amLODIPine  5 mg Oral Daily    buPROPion ER  450 mg Oral Daily    cloNIDine  0.1 mg Oral Once per day on     donepezil  10 mg Oral Nightly    DULoxetine  30 mg Oral QPM    DULoxetine  60  mg Oral Daily    enalapril  20 mg Oral BID    fenofibrate micronized  134 mg Oral Daily with breakfast    hydroCHLOROthiazide  25 mg Oral Daily    labetalol  100 mg Oral QAM    levothyroxine  100 mcg Oral Before breakfast    memantine  5 mg Oral Daily    montelukast  10 mg Oral Nightly    pantoprazole  20 mg Oral QAM AC       Assessment & Plan:      #Cervical myelopathy  -S/p cervical laminectomy and fusion on 11/14  -Management per neurosurgery     #Hypertension  -Hydrochlorothiazide, amlodipine, clonidine, enalapril, labetalol    #Hyperlipidemia  -Fenofibrate     #Diabetes  -Not on meds at home      #Dementia  -Donepezil, memantine     #Hypothyroidism  -Levothyroxine     #CKD stage IIIb     #Anxiety  -Duloxetine, buspirone    DC planning per primary team Neurosurgery     Gabriela Wylie DO    Supplementary Documentation:     Quality:  DVT Mechanical Prophylaxis: TRACEY hose, SCDs,    DVT Pharmacologic Prophylaxis   Medication   None                Code Status: Not on file  García: No urinary catheter in place  García Duration (in days):   Central line:    BORA: 11/18/2024    The 21st Century Cures Act makes medical notes like these available to patients in the interest of transparency. Please be advised this is a medical document. Medical documents are intended to carry relevant information, facts as evident, and the clinical opinion of the practitioner. The medical note is intended as peer to peer communication and may appear blunt or direct. It is written in medical language and may contain abbreviations or verbiage that are unfamiliar.

## 2024-11-18 NOTE — CM/SW NOTE
Spoke with Katty from [facility] in [place] who confirmed pt can be accepted for admission today.  Updated Rn who confirmed medical clearance for discharge.  Drain removed this morning.  Spoke with pt at bedside and informed her of costs for medicar transport.  Pt agreeable with plan.  [facility]icar scheduled for 1130.  PCS form completed and available for RN to print.  No other needs at this time.  / to remain available for support and/or discharge planning.     [facility] [place]  [phone], [facility]  [phone]    eLta Ramirez, [facility]  Discharge Planner  [phone]

## 2024-11-18 NOTE — DISCHARGE SUMMARY
[facility]  DISCHARGE SUMMARY    Anjelica Bran Patient Status:  Inpatient    3/20/1950 MRN [id number]   Location [facility] 3SW-A Attending No att. providers found   Hosp Day # 4 PCP ASHLEY URIOSTEGUI     Date of Admission: 2024    Date of Discharge: 2024      ADMISSION DIAGNOSIS:   Cervical myelopathy (HCC) [G95.9]      DISCHARGE DIAGNOSIS:   Patient Active Problem List   Diagnosis    Stage 3b chronic kidney disease (HCC)    Anemia in chronic kidney disease    Degeneration of lumbar intervertebral disc    Diabetes mellitus without complication (HCC)    Diabetic renal disease (HCC)    Hypertensive heart and kidney disease    Lumbar spondylosis    Neurogenic claudication    Cervical myelopathy (HCC)    Primary hypertension    Mixed hyperlipidemia    Cervical myelopathy (HCC) [G95.9]      REASON FOR ADMISSION: Planned surgical procedure      PROCEDURES: Posterior cervical 3  - cervical 4, cervical 4 - cervical 5, cervical 5 - cervical 6, cervical 6 - cervical 7 laminectomy and fusion from cervical 3- 4, cervical 4-5, cervical 5-6, cervical 6 -7, cervical 7- thoracic 1 and thoracic 1 - 2 with lateral mass and pedicle screws and bone graft.       HISTORY OF PRESENT ILLNESS:   Dacia Bran is a(n) 74 year old female with neck pain and UE radiculopathy as well as gait instability.  Patient uses a walker and wheelchair.  MRI shows severe spinal stenosis from C3-T1 with cervical kyphosis and bowstring effect.  Upon flex ex her kyphosis corrects.  Discussed anterior surgery but with C7-T1 stenosis do not feel this would be adeuqate therefore recommend C3-C7 lami and C3-T2 fusion.  Risks and benefits discussed at length. Recommend rehab post surgery.       HOSPITAL COURSE:   This patient presented for the above surgical procedure as planned. Patient underwent surgical procedure without complication and was admitted to the Ortho Spine Unit post-operatively. Patient demonstrated improvement of pre-operative  symptoms, as well as increased mobility and adequate pain control during the hospital course.  Patient was evaluated by ancillary services and was recommended to discharge to [facility].     COMPLICATIONS: None    DISCHARGE CONDITION: Stable    DISPOSITION: [facility]    DISCHARGE MEDICATIONS:   Discharge Medication List as of 11/18/2024 10:40 AM        START taking these medications    Details   diazePAM 5 MG Oral Tab Take 1 tablet (5 mg total) by mouth every 8 (eight) hours as needed (to be given if spasms not relieved by methocarbamol)., Normal, Disp-30 tablet, R-0      docusate sodium 100 MG Oral Cap Take 100 mg by mouth 2 (two) times daily., Normal, Disp-30 capsule, R-0      HYDROcodone-acetaminophen 5-325 MG Oral Tab Take 1-2 tablets by mouth every 4 (four) hours as needed., Normal, Disp-50 tablet, R-0      Naloxone HCl 4 MG/0.1ML Nasal Liquid 4 mg by Nasal route as needed. If patient remains unresponsive, repeat dose in other nostril 2-5 minutes after first dose., Normal, Disp-1 kit, R-0           CONTINUE these medications which have NOT CHANGED    Details   !! DULoxetine 30 MG Oral Cap DR Particles Take 1 capsule (30 mg total) by mouth every evening., Historical      hydroCHLOROthiazide 25 MG Oral Tab Take 1 tablet (25 mg total) by mouth daily., Historical      pantoprazole 20 MG Oral Tab EC Take 1 tablet (20 mg total) by mouth every morning before breakfast., Historical      montelukast 10 MG Oral Tab Take 1 tablet (10 mg total) by mouth nightly., Historical      donepezil 10 MG Oral Tab Take 1 tablet (10 mg total) by mouth nightly., Historical      levothyroxine 100 MCG Oral Tab Take 1 tablet (100 mcg total) by mouth before breakfast., Historical      amLODIPine 5 MG Oral Tab Take 1 tablet (5 mg total) by mouth daily., Historical      !! buPROPion  MG Oral Tablet 24 Hr Take 1 tablet (300 mg total) by mouth daily. 450 mg daily, Historical      !! buPROPion  MG Oral Tablet 24 Hr Take 1 tablet (150 mg total)  by mouth daily., Historical      cloNIDine 0.1 MG Oral Tab Take 1 tablet (0.1 mg total) by mouth 4 (four) times a week. Sunday, Monday, wed, and friday, Historical      enalapril 20 MG Oral Tab Take 1 tablet (20 mg total) by mouth 2 (two) times daily., Historical      fenofibrate 145 MG Oral Tab Take 1 tablet (145 mg total) by mouth daily., Historical      Potassium Chloride ER 10 MEQ Oral Tab CR Take 1 tablet (10 mEq total) by mouth daily., Historical      !! DULoxetine 60 MG Oral Cap DR Particles Take 1 capsule (60 mg total) by mouth daily., Historical      folic acid 1 MG Oral Tab Take 1 tablet (1 mg total) by mouth daily., Historical      ferrous sulfate 325 (65 FE) MG Oral Tab EC Take 1 tablet (325 mg total) by mouth daily with breakfast., Historical      memantine 5 MG Oral Tab Take 1 tablet (5 mg total) by mouth daily., Historical      labetalol 100 MG Oral Tab Take 1 tablet (100 mg total) by mouth every morning., Historical      Cholecalciferol 125 MCG (5000 UT) Oral Tab Take 1 tablet (5,000 Units total) by mouth daily., Historical      Biotin 1000 MCG Oral Tab Take 1,000 mcg by mouth Noon., Historical       !! - Potential duplicate medications found. Please discuss with provider.          FOLLOW UP VISITS:   Future Appointments   Date Time Provider Department Center   11/22/2024 11:00 AM Odilia Barakat APRN ENSAVI2 [facility]   12/11/2024 11:20 AM SULY Cheek DO ENINAPER2 [facility]   1/6/2025 11:30 AM Odilia Barakat APRN ENSAVI2 [facility]       FOLLOW UP LABS: None    FOLLOW UP IMAGING: None    PATIENT INSTRUCTIONS:   Cervical Spine Surgery: Discharge Instructions     Activity Restrictions  No twisting, pulling, pushing or lifting > 10 lbs (a gallon of milk is approximately 8 lbs)  No lifting anything over your head.  Turn your body as a unit, especially if you use a hard collar. Don’t turn your head suddenly or extremely to look from side to side.     Sitting  Sit with your knees at  about the same level as your hips; use a footstool if needed.  Firm chairs with straight backs give better support’ recliners are OK to use.  Change position every 20-30 minutes when sitting (example: after sitting, stand, then you can sit again for another 20-30 minutes).    Walking  Listen to your body and walk as much as you tolerate.  Walk several times a day.  Smaller distances are better.  Your goal is to increase the distance you walk each day.  Remember to listen to your body.    Swallowing  It is not uncommon to experience throat soreness or mild difficulty swallowing after surgery. This is due to swelling of the muscles and tissues in the area. This will improve within 1-2 weeks after surgery.  Taking small bites of food followed by small sips of liquids can help.  You may drink hot or cold liquids, to your preference.  If you experience any choking, coughing, or difficulty getting food or liquids down, please notify your surgeon's office immediately.     Stairs  Climb as needed.     Sex  After two weeks and only when comfortable.  Stop if causing pain.  Check with surgeon for more information.    Sleeping  Log roll to turn.  Support your neck with one pillow keeping it in a neutral position  Sleep on back or side.  Avoid sleeping on stomach.    Driving  Check with physician at office visit when you may resume driving.  Do not drive while taking narcotics or muscle relaxants  No driving while using or needing a cervical collar.  Adhere to sitting restrictions.  You may be a passenger.  Wear your seat belt.    Smoking  Smoking is prohibited after surgery, as smoking will inhibit the spine from healing.  Even one cigarette a day will cause problems.  Chewing tobacco, nicotine gum or patches will also inhibit bone healing.    Physical Therapy  You will be cleared to start PT at your 1-month visit.    Return to work  To be discussed with your surgeon.      Neck Brace (If Applicable)    Hard cervical  collar  Wear as instructed  May remove when showering. Place collar back on after showering.  Wear even when sleeping IF instructed to do so.  Exact time (weeks) collar to be worn to be determined by surgeon. Discuss at office visit.  Keep neck straight while removing and replacing collar (No bending, turning or flexing neck with collar off)     Soft cervical collar  You may purchase a soft collar over the counter ([facility], [employer], etc)  Wear for comfort  May remove when showering. Replace as needed when done showering.       Incisional Care  Your incision is closed with Steri Strips and dissolvable stitches under the skin.  Do not remove steri strips - they will fall off on their own. Any remaining strips will be removed at your 1 week post-op visit.     Bathing/Showering   Do not submerge your incision (tub, baths, or pools) for 4 weeks after surgery, once cleared by surgeon.  You may shower on post-op day 2.  You may remove the outer gauze covering on your incision. It is okay for steri strips to get wet.  After shower, pat incision dry with clean towel.  Do not apply any lotions or ointments to incision.  After showering, you may leave your incision open to air.      Diet and Constipation Prevention  Soft diet may help if you have discomfort while swallowing.  Cold drinks or food may feel more soothing.  Drink six to eight glasses liquid (water, juice) per day.  Eat a high fiber diet (bran, vegetables, fruit).  Use an over the counter stool softener such as Colace or Senakot while taking narcotics to prevent constipation, or add a laxative such as Miralax or Milk of Magnesia as needed.  An enema or suppository may be needed if above measures do not work.       Pain Management     Relaxation techniques  A way to focus your attention other than on your pain. Your brain makes endorphins that are a natural body chemical that can decrease pain. Some examples of relaxation techniques are deep breathing, listening  to music or meditating.     Medication  No NSAIDS until okay with surgeon.  NSAIDS (non-steroidal anti-inflammatory) include: Aspirin, Motrin, ibuprofen, Advil, Aleve, Naprosyn, Indocin, Nuprin, Vioxx, Celebrex, Bextra.   Tylenol (acetaminophen) is okay. Caution if your pain med contains Tylenol (acetaminophen); maximum 3 gms of Tylenol (acetaminophen) in 24 hours.  Take muscle relaxants and pain medications as prescribed allowing 30-45 minutes to take effect.  Do NOT drink alcohol while on pain medication.  Do NOT drive or operate machinery while taking narcotic medcations  Monitor need for pain medication refills closely.  Call pharmacy or physician office at least five days before out of pain medication. If calling physician office after 3 pm, it will be handled on the next business day.     Post-op Office Visit  Schedule 2 weeks after surgery with surgeon as directed at discharge  Schedule one week follow up with Primary Care Physician. Review all medications.     When to seek additional assistance:  Contact your surgeon:  Temp > 101F; take your temperature twice a day.  Increased pain, swelling, redness, or any drainage to incision.  Separation of incision.  Sudden reappearance of arm pain that won’t go away with pain medication.  Increased numbness or weakness.  Severe headaches.  Increased difficulty swallowing  Difficulty urinating or having a bowel movement     Go directly to the ER or CALL 911:  become short of breath  have chest pain  cough up blood  have unexplained anxiety with breathing         Odilia Barakat, MSN, APRN, FNP-[facility]  11/18/2024 1:05 PM

## 2024-11-21 DIAGNOSIS — Z98.1 S/P CERVICAL SPINAL FUSION: Primary | ICD-10-CM

## 2024-11-21 NOTE — PROGRESS NOTES
Patient discharged from hospital on 11/18. 1-week post op visit on 11/22 cancelled, as patient is at [facility]. Ordered XR cervical spine. Patient instructed to complete imaging prior to appointment with Dr. Cheek on 12/11.

## 2024-11-27 ENCOUNTER — MED REC SCAN ONLY (OUTPATIENT)
Dept: SURGERY | Facility: CLINIC | Age: 74
End: 2024-11-27

## 2024-12-10 ENCOUNTER — TELEPHONE (OUTPATIENT)
Dept: SURGERY | Facility: CLINIC | Age: 74
End: 2024-12-10

## 2024-12-10 NOTE — TELEPHONE ENCOUNTER
Received order DOS 11/27/24 order# [id number] from [employer] that requires review and signature from provider.      Endorsed to [facility] for clinical staff.

## 2024-12-11 ENCOUNTER — HOSPITAL ENCOUNTER (OUTPATIENT)
Dept: GENERAL RADIOLOGY | Facility: HOSPITAL | Age: 74
Discharge: HOME | End: 2024-12-11
Payer: MEDICARE

## 2024-12-11 ENCOUNTER — OFFICE VISIT (OUTPATIENT)
Dept: SURGERY | Facility: CLINIC | Age: 74
End: 2024-12-11
Payer: MEDICARE

## 2024-12-11 VITALS — DIASTOLIC BLOOD PRESSURE: 72 MMHG | SYSTOLIC BLOOD PRESSURE: 130 MMHG | OXYGEN SATURATION: 96 % | HEART RATE: 82 BPM

## 2024-12-11 DIAGNOSIS — Z98.1 S/P CERVICAL SPINAL FUSION: ICD-10-CM

## 2024-12-11 DIAGNOSIS — N18.32 STAGE 3B CHRONIC KIDNEY DISEASE (HCC): ICD-10-CM

## 2024-12-11 DIAGNOSIS — F02.80 DEMENTIA IN OTHER DISEASES CLASSIFIED ELSEWHERE, UNSPECIFIED SEVERITY, WITHOUT BEHAVIORAL DISTURBANCE, PSYCHOTIC DISTURBANCE, MOOD DISTURBANCE, AND ANXIETY (HCC): ICD-10-CM

## 2024-12-11 DIAGNOSIS — J42 CHRONIC BRONCHITIS, UNSPECIFIED CHRONIC BRONCHITIS TYPE (HCC): ICD-10-CM

## 2024-12-11 DIAGNOSIS — G95.9 CERVICAL MYELOPATHY (HCC): Primary | ICD-10-CM

## 2024-12-11 DIAGNOSIS — E11.21 DIABETIC NEPHROPATHY ASSOCIATED WITH TYPE 2 DIABETES MELLITUS (HCC): ICD-10-CM

## 2024-12-11 PROCEDURE — 99024 POSTOP FOLLOW-UP VISIT: CPT | Performed by: NEUROLOGICAL SURGERY

## 2024-12-11 PROCEDURE — 72050 X-RAY EXAM NECK SPINE 4/5VWS: CPT

## 2024-12-11 RX ORDER — METHOCARBAMOL 500 MG/1
TABLET, FILM COATED ORAL
COMMUNITY
Start: 2024-12-03

## 2024-12-11 RX ORDER — HYDROCODONE BITARTRATE AND ACETAMINOPHEN 5; 325 MG/1; MG/1
1-2 TABLET ORAL EVERY 4 HOURS PRN
Qty: 50 TABLET | Refills: 0 | Status: SHIPPED | OUTPATIENT
Start: 2024-12-11

## 2024-12-11 RX ORDER — METHOCARBAMOL 500 MG/1
500 TABLET, FILM COATED ORAL 3 TIMES DAILY
Qty: 60 TABLET | Refills: 0 | Status: SHIPPED | OUTPATIENT
Start: 2024-12-11

## 2024-12-11 NOTE — PATIENT INSTRUCTIONS
Refill policies:    Allow 2-3 business days for refills; controlled substances may take longer.  Contact your pharmacy at least 5 days prior to running out of medication and have them send an electronic request or submit request through the “request refill” option in your [employer] account.  Refills are not addressed on weekends; covering physicians do not authorize routine medications on weekends.  No narcotics or controlled substances are refilled after noon on Fridays or by on call physicians.  By law, narcotics must be electronically prescribed.  A 30 day supply with no refills is the maximum allowed.  If your prescription is due for a refill, you may be due for a follow up appointment.  To best provide you care, patients receiving routine medications need to be seen at least once a year.  Patients receiving narcotic/controlled substance medications need to be seen at least once every 3 months.  In the event that your preferred pharmacy does not have the requested medication in stock (e.g. Backordered), it is your responsibility to find another pharmacy that has the requested medication available.  We will gladly send a new prescription to that pharmacy at your request.    Scheduling Tests:    If your physician has ordered radiology tests such as MRI or CT scans, please contact Central Scheduling at [phone] right away to schedule the test.  Once scheduled, the [facility] Centralized Referral Team will work with your insurance carrier to obtain pre-certification or prior authorization.  Depending on your insurance carrier, approval may take 3-10 days.  It is highly recommended patients assure they have received an authorization before having a test performed.  If test is done without insurance authorization, patient may be responsible for the entire amount billed.      Precertification and Prior Authorizations:  If your physician has recommended that you have a procedure or additional testing performed the [facility]  Centralized Referral Team will contact your insurance carrier to obtain pre-certification or prior authorization.    You are strongly encouraged to contact your insurance carrier to verify that your procedure/test has been approved and is a COVERED benefit.  Although the [facility] Centralized Referral Team does its due diligence, the insurance carrier gives the disclaimer that \"Although the procedure is authorized, this does not guarantee payment.\"    Ultimately the patient is responsible for payment.   Thank you for your understanding in this matter.  Paperwork Completion:  If you require FMLA or disability paperwork for your recovery, please make sure to either drop it off or have it faxed to our office at [phone]. Be sure the form has your name and date of birth on it.  The form will be faxed to our Forms Department and they will complete it for you.  There is a 25$ fee for all forms that need to be filled out.  Please be aware there is a 10-14 day turnaround time.  You will need to sign a release of information (MIKAELA) form if your paperwork does not come with one.  You may call the Forms Department with any questions at [phone].  Their fax number is [phone].

## 2024-12-11 NOTE — PROGRESS NOTES
S: Patient doing well. Denies any new symptoms.  R arm weakness persists.    O: AVSS   Neuro: stable    A/P s/p cervical fusion  -XR shows mature healing and intact hardware  -start outpateint PT  -refill meds  -F/U in 2 months

## 2024-12-13 NOTE — TELEPHONE ENCOUNTER
Received order DOS 11/27/24 order# [id number] from [employer]   received by [facility] clinical staff, endorsed to provider for review. Placed on Odilia's desk   Will be sent to scanning once received back from provider.

## 2024-12-19 ENCOUNTER — TELEPHONE (OUTPATIENT)
Dept: SURGERY | Facility: CLINIC | Age: 74
End: 2024-12-19

## 2024-12-19 NOTE — TELEPHONE ENCOUNTER
Rec'd Home Health Cert and Plan of Care from [facility] order # [id number].Endorsed to nurse's bin for provider review.

## 2024-12-20 NOTE — TELEPHONE ENCOUNTER
Rec'd Home Health Cert and Plan of Care from [facility] order # [id number]       received by MA clinical staff, endorsed to provider for review. Placed on Odilia's desk   Will be sent to scanning once received back from provider.

## 2024-12-24 ENCOUNTER — TELEPHONE (OUTPATIENT)
Dept: SURGERY | Facility: CLINIC | Age: 74
End: 2024-12-24

## 2024-12-24 NOTE — TELEPHONE ENCOUNTER
Fax received from [facility] with patient note of missed appointment on 12/12/24. Note left in nurses bin for review.

## 2024-12-24 NOTE — TELEPHONE ENCOUNTER
[facility] with patient note of missed appointment on 12/12/24 received by MA clinical staff, endorsed to provider for review.     Placed on Odilia's desk    Will be sent to scanning once received back from provider.

## 2024-12-27 NOTE — TELEPHONE ENCOUNTER
Provider has reviewed and signed Physician Order-12/12/24 from [facility].    Paperwork faxed to #[phone]  Received fax confirmation   Paperwork sent to scan

## 2025-01-08 ENCOUNTER — TELEPHONE (OUTPATIENT)
Dept: SURGERY | Facility: CLINIC | Age: 75
End: 2025-01-08

## 2025-01-08 NOTE — TELEPHONE ENCOUNTER
Received Physical therapy re-evaluation DOS 01/03/25 from [employer] [facility] , for review and signature from provider.    Endorsed in neuro surgery bin for clinical staff.

## 2025-01-08 NOTE — TELEPHONE ENCOUNTER
[facility] staff in regard to question on patient still having staple on the right side of her head.     Should Yulia remove at patient home or should patient be seen in office. Please advise

## 2025-01-10 NOTE — TELEPHONE ENCOUNTER
Received Physical therapy re-evaluation DOS 01/03/25 from [employer] [facility]      received by MA clinical staff, endorsed to provider for review.     Placed on Odilia's desk     Will be sent to scanning once received back from provider.

## 2025-01-14 ENCOUNTER — TELEPHONE (OUTPATIENT)
Dept: SURGERY | Facility: CLINIC | Age: 75
End: 2025-01-14

## 2025-01-14 DIAGNOSIS — M54.2 CERVICALGIA: ICD-10-CM

## 2025-01-14 DIAGNOSIS — M43.23 FUSION OF SPINE, CERVICOTHORACIC REGION: Primary | ICD-10-CM

## 2025-01-14 DIAGNOSIS — Z98.1 S/P CERVICAL SPINAL FUSION: ICD-10-CM

## 2025-01-14 NOTE — TELEPHONE ENCOUNTER
Called and spoke with the patient.  She states for the past 3 days she has been having an increase in symptoms.  Patient states pain is not 10/10 but pain was significant enough last night that she was unable to sleep. Patient struggles to provide pain scale number but more than 5/10.    Pain is in the back of the neck radiating between the shoulder blades and top of both arms. She feels a pulling and  Burning sensation that is continuous. She believes she has had a few flare ups since November, but this one is lasting longer and a bit more intense.     She also has difficulty moving her neck over these past few days, she has home physical therapy coming to her home and she had some gentle massage that helped while they massaged her but as soon as they stopped the pain came back.     Previously discussed with provider in office, provider stated she can send a medrol dose pack to patient's pharmacy and/or change muscle relaxer. Patient lives over an hour away and would like to avoid having to come in until her next appointment.    Patient is open to attempting medrol dose pack, and trying new muscle relaxer, but she would also like her [place] refilled.     Routed to provider for prescriptions  Pharmacy verified with patient.

## 2025-01-14 NOTE — TELEPHONE ENCOUNTER
Attempted to call patient, phone answered by individual not on verbal release. Unable to leave message.   Individual stated they will have patient call back.    Patient had surgery on 11/4/24 with Dr. Cheek ( posterior cervical fusion) at last office visit patient was doing well with continued right arm weakness. She had Norco 5-325 mg prescribed last at that visit.    Need to determine what has changed since then, any new injuries? When did this recent pain develop, and characteristics of symptoms.

## 2025-01-14 NOTE — TELEPHONE ENCOUNTER
Patient contacted the office requesting a refill of: HYDROcodone-acetaminophen 5-325 MG Oral Tab   She states that she is currently 10/10 pain and unable to lift her neck with a lot of shoulder and arm discomfort. She asked if there is another pain medication that can be prescribed. Please contact and advise.

## 2025-01-15 RX ORDER — METHYLPREDNISOLONE 4 MG/1
TABLET ORAL
Qty: 1 EACH | Refills: 0 | Status: SHIPPED | OUTPATIENT
Start: 2025-01-15

## 2025-01-15 NOTE — TELEPHONE ENCOUNTER
Message below received. Patient reporting an acute flare-up of neck stiffness with pulling and burning to the interscapular region. States PT did massage her neck which offered some relief but symptoms quickly recurred. She has no new weakness nor radicular symptoms of upper extremities.     Will prescribe a medrol dose pack. According to medication list, patient is still taking Diazepam and Robaxin. Please clarify with patient if she is still taking/still has these medications, as I would not be able to prescribe any additional muscle relaxants if she is still taking these. I can provide refills of the above if they worked for her in the past.

## 2025-01-16 NOTE — TELEPHONE ENCOUNTER
Called and spoke with patient,  Patient has been out of diazepam for a while she and she is not currently taking anything because she is out.    She would prefer to try a something stronger.  Patient also states she needs her NORCO filled

## 2025-01-16 NOTE — TELEPHONE ENCOUNTER
Patient is requesting to speak with clinical staff in regard to questions on why medication Hydrocodone and Methocarbamol was not sent to the pharmacy. Please advise

## 2025-01-17 RX ORDER — METHOCARBAMOL 500 MG/1
500 TABLET, FILM COATED ORAL 3 TIMES DAILY
Qty: 60 TABLET | Refills: 0 | Status: SHIPPED | OUTPATIENT
Start: 2025-01-17

## 2025-01-17 RX ORDER — HYDROCODONE BITARTRATE AND ACETAMINOPHEN 5; 325 MG/1; MG/1
1 TABLET ORAL EVERY 6 HOURS PRN
Qty: 20 TABLET | Refills: 0 | Status: SHIPPED | OUTPATIENT
Start: 2025-01-17

## 2025-01-17 NOTE — TELEPHONE ENCOUNTER
Tried to call patient. The mailbox is full and unable to leave a message, we will have to try again later.

## 2025-01-17 NOTE — TELEPHONE ENCOUNTER
Methocarbamol and short-course of Norco refilled.     Patient is now 2 months out from surgery, so our goal should be to wean off Norco completley. For the current flare up, patient should reserve Norco for severe pain only.

## 2025-01-27 ENCOUNTER — OFFICE VISIT (OUTPATIENT)
Dept: SURGERY | Facility: CLINIC | Age: 75
End: 2025-01-27
Payer: MEDICARE

## 2025-01-27 VITALS
DIASTOLIC BLOOD PRESSURE: 84 MMHG | SYSTOLIC BLOOD PRESSURE: 128 MMHG | BODY MASS INDEX: 28.17 KG/M2 | HEIGHT: 63 IN | WEIGHT: 159 LBS | HEART RATE: 90 BPM

## 2025-01-27 DIAGNOSIS — M62.838 MUSCLE SPASMS OF NECK: ICD-10-CM

## 2025-01-27 DIAGNOSIS — M43.23 FUSION OF SPINE OF CERVICOTHORACIC REGION: Primary | ICD-10-CM

## 2025-01-27 RX ORDER — CYCLOBENZAPRINE HCL 10 MG
10 TABLET ORAL 3 TIMES DAILY PRN
Qty: 30 TABLET | Refills: 0 | Status: SHIPPED | OUTPATIENT
Start: 2025-01-27

## 2025-01-27 RX ORDER — TRAMADOL HYDROCHLORIDE 50 MG/1
50 TABLET ORAL EVERY 6 HOURS PRN
Qty: 20 TABLET | Refills: 0 | Status: SHIPPED | OUTPATIENT
Start: 2025-01-27

## 2025-01-27 NOTE — PROGRESS NOTES
Established patient follow up     2 month post op from surgery 11/14/24    Xray done 12/11/24    Pain- 4/10    Finished physical therapy with some improvement

## 2025-01-27 NOTE — PROGRESS NOTES
[facility]  Neurosurgery Clinic Visit: Post-Op Follow Up  2025     Anjelica Bran PCP: ASHLEY JORDAN 3/20/1950 MRN [id number]     REASON FOR VISIT: 2 month post-op follow up    SUBJECTIVE     Anjelica Bran is a pleasant 74 year old female here for follow up s/p posterior C3-T2 fusion on 24 with Dr. Cheek. She is accompanied by her son.  Patient states she has been doing well overall.  She reports ongoing muscle spasms to the posterior neck, shoulder, and interscapular region.  We have been trying to wean her Norco, however she feel she still requires a stronger pain medication when spasms are severe. She has no new numbness, tingling, or weakness of her upper or lower extremities.  She completed PT.  She is concerned about 2 raised bumps that she feels beneath her incision. She plans to travel to [place] next month and is unsure how long she will be there.    MEDICAL HISTORY     Past Medical History:    Anesthesia complication    post op confusion, prolonged wake time    Anxiety state    Cervical myelopathy (HCC)    Chronic kidney disease, stage 3b (HCC)    Depression    Diabetes (HCC)    Essential hypertension    High blood pressure    High cholesterol    Renal disorder    Shortness of breath    excertional    Visual impairment    glasses      Past Surgical History:   Procedure Laterality Date    Hysterectomy      Lumbar spine surgery        No family history on file.   Social History     Socioeconomic History    Marital status:    Tobacco Use    Smoking status: Former     Types: Cigarettes    Smokeless tobacco: Never    Tobacco comments:     40 years ago    Vaping Use    Vaping status: Never Used   Substance and Sexual Activity    Alcohol use: Never    Drug use: Never      Allergies[1]     MEDICATIONS      methocarbamol 500 MG Oral Tab Take 1 tablet (500 mg total) by mouth 3 (three) times daily. 60 tablet 0    HYDROcodone-acetaminophen 5-325 MG Oral Tab Take 1 tablet  by mouth every 6 (six) hours as needed. 20 tablet 0    methylPREDNISolone (MEDROL) 4 MG Oral Tablet Therapy Pack Take as directed 1 each 0    methocarbamol 500 MG Oral Tab       docusate sodium 100 MG Oral Cap Take 100 mg by mouth 2 (two) times daily. 30 capsule 0    Naloxone HCl 4 MG/0.1ML Nasal Liquid 4 mg by Nasal route as needed. If patient remains unresponsive, repeat dose in other nostril 2-5 minutes after first dose. 1 kit 0    DULoxetine 30 MG Oral Cap DR Particles Take 1 capsule (30 mg total) by mouth every evening.      hydroCHLOROthiazide 25 MG Oral Tab Take 1 tablet (25 mg total) by mouth daily.      pantoprazole 20 MG Oral Tab EC Take 1 tablet (20 mg total) by mouth every morning before breakfast.      montelukast 10 MG Oral Tab Take 1 tablet (10 mg total) by mouth nightly.      donepezil 10 MG Oral Tab Take 1 tablet (10 mg total) by mouth nightly.      levothyroxine 100 MCG Oral Tab Take 1 tablet (100 mcg total) by mouth before breakfast.      amLODIPine 5 MG Oral Tab Take 1 tablet (5 mg total) by mouth daily.      buPROPion  MG Oral Tablet 24 Hr Take 1 tablet (300 mg total) by mouth daily. 450 mg daily      buPROPion  MG Oral Tablet 24 Hr Take 1 tablet (150 mg total) by mouth daily.      cloNIDine 0.1 MG Oral Tab Take 1 tablet (0.1 mg total) by mouth 4 (four) times a week. Sunday, Monday, wed, and friday      enalapril 20 MG Oral Tab Take 1 tablet (20 mg total) by mouth 2 (two) times daily.      fenofibrate 145 MG Oral Tab Take 1 tablet (145 mg total) by mouth daily.      Potassium Chloride ER 10 MEQ Oral Tab CR Take 1 tablet (10 mEq total) by mouth daily.      DULoxetine 60 MG Oral Cap DR Particles Take 1 capsule (60 mg total) by mouth daily.      folic acid 1 MG Oral Tab Take 1 tablet (1 mg total) by mouth daily.      ferrous sulfate 325 (65 FE) MG Oral Tab EC Take 1 tablet (325 mg total) by mouth daily with breakfast.      memantine 5 MG Oral Tab Take 1 tablet (5 mg total) by mouth  daily.      labetalol 100 MG Oral Tab Take 1 tablet (100 mg total) by mouth every morning.      Cholecalciferol 125 MCG (5000 UT) Oral Tab Take 1 tablet (5,000 Units total) by mouth daily.      Biotin 1000 MCG Oral Tab Take 1,000 mcg by mouth Noon.         REVIEW OF SYSTEMS     Denies fever, chills, shortness of breath, chest pain, or calf pain.     PHYSICAL EXAMINATION     VITALS: /84 (BP Location: Left arm, Patient Position: Sitting, Cuff Size: adult)   Pulse 90   Ht 63\"   Wt 159 lb (72.1 kg)   BMI 28.17 kg/m²     GENERAL: No acute distress, non-toxic appearing    HEENT:  Normocephalic, atraumatic    SKIN: Warm, dry. Surgical incision has healed. , dry and intact without signs of drainage, edema, or erythema. Two prominent spinous processes are seen at the medial aspect of incision - nontender on palpation.     NEUROLOGICAL: Alert and oriented x 3. Sensation to light touch is intact bilaterally.  Gait intact, non-antalgic.     UPPER EXTREMITY STRENGTH:    Deltoid  Biceps  Triceps     Finger abduction   Right 5 5 5 5 5   Left 5 5 5 5 5     LOWER EXTREMITY STRENGTH:    Iliospoas  Hamstrings  Quads  D-flexion  P-flexion EHL     Right 5 5 5 5 5 5     Left 5 5 5 5 5 5      IMAGING     No new imaging to review    ASSESSMENT AND PLAN     ASSESSMENT:   1. Fusion of spine of cervicothoracic region    2. Muscle spasms of neck       PLAN:   Discontinue Robaxin and Norco  Start Flexeril TID PRN  Start Tramadol q6h PRN  Educated on the importance of reserving narcotic pain medications for severe pain only. At this point in her post-operative recovery, our goal should be to abstain from opoids altogether.   F/U upon her return to [place]    Odilia Barakat, MSN, APRN, FNP-[facility]  Neurosurgery   [address], [address]  [place] [address]  [phone]        [1]   Allergies  Allergen Reactions    Methotrexate OTHER (SEE COMMENTS)     Swelling

## 2025-01-28 ENCOUNTER — TELEPHONE (OUTPATIENT)
Dept: SURGERY | Facility: CLINIC | Age: 75
End: 2025-01-28

## 2025-01-28 NOTE — TELEPHONE ENCOUNTER
Rec'd orders from [facility] for meds and discharge summary.Endorsed to nurse's bin for provider review.

## 2025-01-31 NOTE — TELEPHONE ENCOUNTER
Orders from [facility] for meds and Discharge Summary received by [facility] clinical staff, endorsed to provider for review.     Placed on Odilia's desk    Will be sent to scanning once received back from provider.

## 2025-05-13 ENCOUNTER — TELEPHONE (OUTPATIENT)
Dept: SURGERY | Facility: CLINIC | Age: 75
End: 2025-05-13

## 2025-05-13 NOTE — TELEPHONE ENCOUNTER
Message below noted.    Patient requesting if XR is needed prior to appointment tomorrow 5/14 with Dr. Cheek.    LOV 1/27/25  \"ASSESSMENT:   1. Fusion of spine of cervicothoracic region    2. Muscle spasms of neck       PLAN:   Discontinue Robaxin and Norco  Start Flexeril TID PRN  Start Tramadol q6h PRN  Educated on the importance of reserving narcotic pain medications for severe pain only. At this point in her post-operative recovery, our goal should be to abstain from opoids altogether.   F/U upon her return to [place]\"    Routed to Provider.

## 2025-05-13 NOTE — TELEPHONE ENCOUNTER
Patient calling to see if she will need an Xray completed for her appointment tomorrow with Dr. Cheek

## 2025-05-14 ENCOUNTER — OFFICE VISIT (OUTPATIENT)
Dept: SURGERY | Facility: CLINIC | Age: 75
End: 2025-05-14
Payer: MEDICARE

## 2025-05-14 VITALS
HEART RATE: 75 BPM | BODY MASS INDEX: 28.35 KG/M2 | OXYGEN SATURATION: 98 % | HEIGHT: 63 IN | SYSTOLIC BLOOD PRESSURE: 128 MMHG | DIASTOLIC BLOOD PRESSURE: 76 MMHG | WEIGHT: 160 LBS

## 2025-05-14 DIAGNOSIS — M43.23 FUSION OF SPINE OF CERVICOTHORACIC REGION: Primary | ICD-10-CM

## 2025-05-14 PROCEDURE — 99212 OFFICE O/P EST SF 10 MIN: CPT | Performed by: NEUROLOGICAL SURGERY

## 2025-05-14 NOTE — PROGRESS NOTES
[facility]  Neurological Surgery Clinic Note    Anjelica Bran  3/20/1950  [id number]  PCP: ASHLEY URIOSTEGUI    REASON FOR VISIT:  S/p cervical fusion    HISTORY OF PRESENT ILLNESS:  Anjelica Bran is a(n) 75 year old female s/p cervical fusion.  She has done welll. She has no new deficits.  She has fully returned to full activity.  She has some back pain and LE numbness but has had prior lumbar decompression.  The symptoms do not affect her all the time.  She denies any weakness nor falls. She is ambulatory.       Location: neck pain  Quality: dull  Severity: improved  Duration: since prior to srugery  Timing: any  Context: s/p cervical fusion  Modifying Factors: cervical fusion, PT   Associated signs/symptoms: none      PAST MEDICAL HISTORY:  Past Medical History[1]    PAST SURGICAL HISTORY:  Past Surgical History[2]    FAMILY HISTORY:  family history is not on file.    SOCIAL HISTORY:   reports that she has quit smoking. Her smoking use included cigarettes. She has never used smokeless tobacco. She reports that she does not drink alcohol and does not use drugs.    ALLERGIES:  Allergies[3]    MEDICATIONS:  Medications Ordered Prior to Encounter[4]    REVIEW OF SYSTEMS:  Review of Systems   All other systems reviewed and are negative.       PHYSICAL EXAMINATION:  Neurological Exam  Mental Status  Awake, alert and oriented to person, place and time.    Cranial Nerves  CN II: Visual acuity is normal. Visual fields full to confrontation.  CN III, IV, VI: Extraocular movements intact bilaterally. Normal lids and orbits bilaterally. Pupils equal round and reactive to light bilaterally.  CN V: Facial sensation is normal.  CN VII: Full and symmetric facial movement.  CN VIII: Hearing is normal.  CN IX, X: Palate elevates symmetrically. Normal gag reflex.  CN XI: Shoulder shrug strength is normal.  CN XII: Tongue midline without atrophy or fasciculations.    Motor  Normal muscle bulk throughout. No  fasciculations present. Normal muscle tone. No abnormal involuntary movements. Strength is 5/5 throughout all four extremities.    Sensory  Sensation is intact to light touch, pinprick, vibration and proprioception in all four extremities.    Reflexes  Deep tendon reflexes are 2+ and symmetric in all four extremities.    Coordination    Finger-to-nose, rapid alternating movements and heel-to-shin normal bilaterally without dysmetria.    Gait  Normal casual, toe, heel and tandem gait.       IMAGING:  XR: mature fusion, intact hardware    ASSESSMENT:  S/p cervical fusion    Plan:  Continue HEP  F/U in 6 months  Consider MRI L spine if symptoms progress      The plan of care was discussed with the patient at length. All questions and concerns were addressed at this time. The patient verbalized agreement with the plan and was appreciative.     Total visit time: 20 minutes; More than 50% spent coordinating care, counseling, reviewing imaging and discussing medication therapy.     Wei Cheek,   Neurological Surgery  [facility]           [1]   Past Medical History:   Anesthesia complication    post op confusion, prolonged wake time    Anxiety state    Cervical myelopathy (HCC)    Chronic kidney disease, stage 3b (HCC)    Depression    Diabetes (HCC)    Essential hypertension    High blood pressure    High cholesterol    Renal disorder    Shortness of breath    excertional    Visual impairment    glasses   [2]   Past Surgical History:  Procedure Laterality Date    Hysterectomy      Lumbar spine surgery  2022   [3]   Allergies  Allergen Reactions    Methotrexate OTHER (SEE COMMENTS)     Swelling   [4]   Current Outpatient Medications on File Prior to Visit   Medication Sig Dispense Refill    cyclobenzaprine 10 MG Oral Tab Take 1 tablet (10 mg total) by mouth 3 (three) times daily as needed for Muscle spasms. 30 tablet 0    traMADol 50 MG Oral Tab Take 1 tablet (50 mg total) by mouth every 6 (six) hours  as needed for Pain. 20 tablet 0    docusate sodium 100 MG Oral Cap Take 100 mg by mouth 2 (two) times daily. 30 capsule 0    Naloxone HCl 4 MG/0.1ML Nasal Liquid 4 mg by Nasal route as needed. If patient remains unresponsive, repeat dose in other nostril 2-5 minutes after first dose. 1 kit 0    DULoxetine 30 MG Oral Cap DR Particles Take 1 capsule (30 mg total) by mouth every evening.      hydroCHLOROthiazide 25 MG Oral Tab Take 1 tablet (25 mg total) by mouth daily.      pantoprazole 20 MG Oral Tab EC Take 1 tablet (20 mg total) by mouth every morning before breakfast.      montelukast 10 MG Oral Tab Take 1 tablet (10 mg total) by mouth nightly.      donepezil 10 MG Oral Tab Take 1 tablet (10 mg total) by mouth nightly.      levothyroxine 100 MCG Oral Tab Take 1 tablet (100 mcg total) by mouth before breakfast.      amLODIPine 5 MG Oral Tab Take 1 tablet (5 mg total) by mouth daily.      buPROPion  MG Oral Tablet 24 Hr Take 1 tablet (300 mg total) by mouth daily. 450 mg daily      buPROPion  MG Oral Tablet 24 Hr Take 1 tablet (150 mg total) by mouth daily.      cloNIDine 0.1 MG Oral Tab Take 1 tablet (0.1 mg total) by mouth 4 (four) times a week. Sunday, Monday, wed, and friday      enalapril 20 MG Oral Tab Take 1 tablet (20 mg total) by mouth 2 (two) times daily.      fenofibrate 145 MG Oral Tab Take 1 tablet (145 mg total) by mouth daily.      Potassium Chloride ER 10 MEQ Oral Tab CR Take 1 tablet (10 mEq total) by mouth daily.      DULoxetine 60 MG Oral Cap DR Particles Take 1 capsule (60 mg total) by mouth daily.      folic acid 1 MG Oral Tab Take 1 tablet (1 mg total) by mouth daily.      ferrous sulfate 325 (65 FE) MG Oral Tab EC Take 1 tablet (325 mg total) by mouth daily with breakfast.      memantine 5 MG Oral Tab Take 1 tablet (5 mg total) by mouth daily.      labetalol 100 MG Oral Tab Take 1 tablet (100 mg total) by mouth every morning.      Cholecalciferol 125 MCG (5000 UT) Oral Tab Take 1  tablet (5,000 Units total) by mouth daily.      Biotin 1000 MCG Oral Tab Take 1,000 mcg by mouth Noon.       No current facility-administered medications on file prior to visit.

## 2025-05-14 NOTE — PATIENT INSTRUCTIONS
Refill policies:    Allow 2-3 business days for refills; controlled substances may take longer.  Contact your pharmacy at least 5 days prior to running out of medication and have them send an electronic request or submit request through the “request refill” option in your [employer] account.  Refills are not addressed on weekends; covering physicians do not authorize routine medications on weekends.  No narcotics or controlled substances are refilled after noon on Fridays or by on call physicians.  By law, narcotics must be electronically prescribed.  A 30 day supply with no refills is the maximum allowed.  If your prescription is due for a refill, you may be due for a follow up appointment.  To best provide you care, patients receiving routine medications need to be seen at least once a year.  Patients receiving narcotic/controlled substance medications need to be seen at least once every 3 months.  In the event that your preferred pharmacy does not have the requested medication in stock (e.g. Backordered), it is your responsibility to find another pharmacy that has the requested medication available.  We will gladly send a new prescription to that pharmacy at your request.    Scheduling Tests:    If your physician has ordered radiology tests such as MRI or CT scans, please contact Central Scheduling at [phone] right away to schedule the test.  Once scheduled, the [facility] Centralized Referral Team will work with your insurance carrier to obtain pre-certification or prior authorization.  Depending on your insurance carrier, approval may take 3-10 days.  It is highly recommended patients assure they have received an authorization before having a test performed.  If test is done without insurance authorization, patient may be responsible for the entire amount billed.      Precertification and Prior Authorizations:  If your physician has recommended that you have a procedure or additional testing performed the [facility]  Centralized Referral Team will contact your insurance carrier to obtain pre-certification or prior authorization.    You are strongly encouraged to contact your insurance carrier to verify that your procedure/test has been approved and is a COVERED benefit.  Although the [facility] Centralized Referral Team does its due diligence, the insurance carrier gives the disclaimer that \"Although the procedure is authorized, this does not guarantee payment.\"    Ultimately the patient is responsible for payment.   Thank you for your understanding in this matter.  Paperwork Completion:  If you require FMLA or disability paperwork for your recovery, please make sure to either drop it off or have it faxed to our office at [phone]. Be sure the form has your name and date of birth on it.  The form will be faxed to our Forms Department and they will complete it for you.  There is a 25$ fee for all forms that need to be filled out.  Please be aware there is a 10-14 day turnaround time.  You will need to sign a release of information (MIKAELA) form if your paperwork does not come with one.  You may call the Forms Department with any questions at [phone].  Their fax number is [phone].

## 2025-05-14 NOTE — PROGRESS NOTES
Established patient:  Reason for follow up:   6 month post op, sx 11/14/24     Numeric Rating Scale:         Pain at Present:  4/10

## 2025-05-30 ENCOUNTER — TELEPHONE (OUTPATIENT)
Dept: SURGERY | Facility: CLINIC | Age: 75
End: 2025-05-30

## 2025-05-30 NOTE — TELEPHONE ENCOUNTER
Received radiology report of MRI Lumbar sp DOS 5/29/25 from [facility].    Endorsed to neurosurgery [facility] for clinical staff.

## 2025-06-04 ENCOUNTER — MED REC SCAN ONLY (OUTPATIENT)
Dept: SURGERY | Facility: CLINIC | Age: 75
End: 2025-06-04

## 2025-06-04 NOTE — TELEPHONE ENCOUNTER
Received imaging reports from [facility] for MRI lumbar spine and MRI hip RT DOS 05/29/25. Imaging reports endorsed to provider for review, placed in [place]'s bin.

## 2025-08-08 ENCOUNTER — TELEPHONE (OUTPATIENT)
Dept: SURGERY | Facility: CLINIC | Age: 75
End: 2025-08-08

## (undated) NOTE — LETTER
To:  Dr Acosta       Date:  10/11/2024  Patient Name: Dacia Bran-Age / Sex: 3/20/1950-A: 74 y  female  Medical Records: [id number]   [facility]: [id number]      Clearance for Surgery Requested by Surgeon    Request for:  Cardiac Clearance    Requested by Surgeon: Dr Augustin Cheek    Surgical Date: 2024    Procedure: anterior cervical discectomy and fusion at cervical 3-4, cervical 4-5, cervical 5-6, and cervical 6-7 with cage, bone graft, plate and screws, Right - Spine Cervical  INTRAOPERATIVE NEURO MONITORING, Right      Please fax the clearance note to the Pre-Admission Testing department.  Thank you.

## (undated) NOTE — LETTER
OUTSIDE TESTING RESULT REQUEST  PLEASE SCHEDULE TESTING AFTER 10/17/24     IMPORTANT: FOR YOUR IMMEDIATE ATTENTION  Please FAX all test results listed below to: [phone]       * * * * If testing is NOT complete, arrange with patient A.S.A.P. * * * *      Patient Name: Dacia Bran  Surgery Date: 2024  Medical Record: [id number]  CSN: [id number]  : 3/20/1950 - A: 74 y     Sex: female  Surgeon(s):  SULY Cheek DO  Procedure: anterior cervical discectomy and fusion at cervical 3-4, cervical 4-5, cervical 5-6, and cervical 6-7 with cage, bone graft, plate and screws  Anesthesia Type: General     Surgeon: SULY Cheek DO     The following Testing and Time Line are REQUIRED PER ANESTHESIA     EKG READ AND SIGNED WITHIN   90 days  Chest X-Ray within 6 months  CBC [with Differential & Platelets] within  90 days  BMP (requires 4 hour fast) within  60 days  Electrolytes within 21 days  PT/INR within  30 days  PTT within  30 days  UA with Reflex to Culture within  30 days  MSSA/MRSA Nasal screening within 30 days      Thank You,   Sent by:Eileen

## (undated) NOTE — LETTER
25    Patient: Anjelica Bran  : [birth year]    To whom it may concern,    This letter should serve as an order for removal of staple on right side of head. Dx: S/P cervical spinal fusion Z98.1.     Thank you,     SULY Cheek